# Patient Record
Sex: FEMALE | Race: WHITE | HISPANIC OR LATINO | Employment: FULL TIME | URBAN - METROPOLITAN AREA
[De-identification: names, ages, dates, MRNs, and addresses within clinical notes are randomized per-mention and may not be internally consistent; named-entity substitution may affect disease eponyms.]

---

## 2017-01-05 ENCOUNTER — APPOINTMENT (OUTPATIENT)
Dept: RADIOLOGY | Facility: IMAGING CENTER | Age: 32
End: 2017-01-05
Payer: COMMERCIAL

## 2017-01-05 ENCOUNTER — OFFICE VISIT (OUTPATIENT)
Dept: PULMONOLOGY | Facility: HOSPICE | Age: 32
End: 2017-01-05
Payer: COMMERCIAL

## 2017-01-05 VITALS
BODY MASS INDEX: 26.66 KG/M2 | SYSTOLIC BLOOD PRESSURE: 110 MMHG | HEIGHT: 65 IN | TEMPERATURE: 97.3 F | DIASTOLIC BLOOD PRESSURE: 80 MMHG | HEART RATE: 94 BPM | RESPIRATION RATE: 14 BRPM | WEIGHT: 160 LBS

## 2017-01-05 VITALS — BODY MASS INDEX: 26.66 KG/M2 | WEIGHT: 160 LBS | HEIGHT: 65 IN

## 2017-01-05 DIAGNOSIS — R06.00 DYSPNEA, UNSPECIFIED TYPE: ICD-10-CM

## 2017-01-05 DIAGNOSIS — R06.2 WHEEZING: ICD-10-CM

## 2017-01-05 DIAGNOSIS — R91.8 NONSPECIFIC ABNORMAL FINDING OF LUNG FIELD: ICD-10-CM

## 2017-01-05 DIAGNOSIS — R05.9 COUGH: ICD-10-CM

## 2017-01-05 PROCEDURE — 71020 DX-CHEST-2 VIEWS: CPT | Mod: TC | Performed by: INTERNAL MEDICINE

## 2017-01-05 PROCEDURE — 99204 OFFICE O/P NEW MOD 45 MIN: CPT | Mod: 25 | Performed by: INTERNAL MEDICINE

## 2017-01-05 RX ORDER — ALBUTEROL SULFATE 90 UG/1
2 AEROSOL, METERED RESPIRATORY (INHALATION) EVERY 6 HOURS PRN
COMMUNITY
End: 2018-06-07

## 2017-01-05 ASSESSMENT — PULMONARY FUNCTION TESTS
FEV1/FVC: 90.76
FEV1: 2.62
FVC_PERCENT_PREDICTED: 88
FEV1: 3.24
FEV1/FVC_PERCENT_PREDICTED: 89
FEV1_PREDICTED: 3.33
FEV1_PERCENT_PREDICTED: 78
FVC: 3.45
FEV1/FVC_PERCENT_PREDICTED: 107
FEV1/FVC_PERCENT_PREDICTED: 86
FEV1_PERCENT_PREDICTED: 97
FVC_PERCENT_PREDICTED: 91
FEV1_PERCENT_CHANGE: 3
FVC: 3.57
FVC_PREDICTED: 3.89
FEV1_PERCENT_CHANGE: 23
FEV1/FVC: 76
FEV1/FVC_PERCENT_CHANGE: 767

## 2017-01-05 NOTE — PROCEDURES
Technician: CHITRA Frye    Technician Comment:  Good patient effort & cooperation.  The results of this test meet the ATS/ERS standards for acceptability & reproducibility.  Test was performed on the REVENTIVE Body Plethysmograph-Elite DX system.  Predicted values were [facility]-3 for spirometry, [facility] for DLCO, ITS for Lung Volumes.  The DLCO was uncorrected for Hgb.  A bronchodilator of Ventolin HFA -2puffs via spacer administered.    Interpretation:    Spirometry reveals a normal forced vital capacity at 88%, a slightly low FEV1 at 78%, and a reduced FEV1/FVC percent at 76%. The FEV1 improves 23% following inhaled bronchodilators and the FEF 25-75% improves 109% following inhaled Ventolin. Low volume loop confirms a dramatic improvement in flow rates following inhaled Ventolin. Lung volumes, gas transfer, and airways resistance are all normal.    Overall, this PFTs consistent with mild asthma with a large and significant bronchodilator response.

## 2017-01-05 NOTE — MR AVS SNAPSHOT
"        Mirlande Jack Dennis   2017 10:40 AM   Office Visit   MRN: [id number]    Department:  [facility]   Dept Phone:  [phone]    Description:  Female : 1985   Provider:  Lobo Murray M.D.           Reason for Visit     Establish Care SOB, Cough, Wheezing       Allergies as of 2017     No Known Allergies      You were diagnosed with     Cough   [786.2.ICD-9-CM]       Wheezing   [786.07.ICD-9-CM]       Dyspnea, unspecified type   [9956004]         Vital Signs     Blood Pressure Pulse Temperature Respirations Height Weight    110/80 mmHg 94 36.3 °C (97.3 °F) (Temporal) 14 1.651 m (5' 5\") 72.576 kg (160 lb)    Body Mass Index Last Menstrual Period Smoking Status             26.63 kg/m2 2016 Former Smoker         Basic Information     Date Of Birth Sex Race Ethnicity Preferred Language    1985 Female  or   Origin (Estonian,Papua New Guinean,Scottish,Hieu, etc) English      Problem List              ICD-10-CM Priority Class Noted - Resolved    Cough R05   2017 - Present    Wheezing R06.2   2017 - Present    Dyspnea R06.00   2017 - Present      Health Maintenance        Date Due Completion Dates    IMM DTaP/Tdap/Td Vaccine (1 - Tdap) 2006 ---    PAP SMEAR 2008 ---    IMM INFLUENZA (1) 2016 ---            Current Immunizations     No immunizations on file.      Below and/or attached are the medications your provider expects you to take. Review all of your home medications and newly ordered medications with your provider and/or pharmacist. Follow medication instructions as directed by your provider and/or pharmacist. Please keep your medication list with you and share with your provider. Update the information when medications are discontinued, doses are changed, or new medications (including over-the-counter products) are added; and carry medication information at all times in the event of emergency situations     Allergies:  No " Known Allergies          Medications  Valid as of: January 05, 2017 - 11:24 AM    Generic Name Brand Name Tablet Size Instructions for use    Albuterol Sulfate (Aero Soln) albuterol 108 (90 BASE) MCG/ACT Inhale 2 Puffs by mouth every 6 hours as needed for Shortness of Breath.        Amoxicillin (Cap) AMOXIL 500 MG Take 1 Cap by mouth 3 times a day.        Azithromycin (Tab) ZITHROMAX 250 MG Use as directed on ASHLY        Azithromycin (Tab) ZITHROMAX 250 MG 2 tabs day number one then one tab daily for remaining 4 days        Benzonatate (Cap) TESSALON 100 MG Take 1 Cap by mouth 3 times a day as needed for Cough.        Fluticasone Furoate-Vilanterol (AEROSOL POWDER, BREATH ACTIVATED) Fluticasone Furoate-Vilanterol 200-25 MCG/INH Take 1 Inhalation by mouth every day. Rinse mouth after use.        Hydrocod Polst-Chlorphen Polst (Liquid CR) TUSSIONEX 10-8 MG/5ML Take 5 mL by mouth every 12 hours.        HydrOXYzine HCl (Tab) ATARAX 25 MG Take 1 Tab by mouth 3 times a day as needed for Itching.        HydrOXYzine HCl (Tab) ATARAX 25 MG Take 1 Tab by mouth 3 times a day as needed for Itching.        MethylPREDNISolone (Tab) MEDROL DOSPACK 4 MG Take as directed.        MethylPREDNISolone (Tablet Therapy Pack) MEDROL DOSEPAK 4 MG Take as directed with food        .                 Medicines prescribed today were sent to:     [id number] - [place], [place] - [address]    [address] [place] [address]    Phone: [phone] Fax: [phone]    Open 24 Hours?: No      Medication refill instructions:       If your prescription bottle indicates you have medication refills left, it is not necessary to call your provider’s office. Please contact your pharmacy and they will refill your medication.    If your prescription bottle indicates you do not have any refills left, you may request refills at any time through one of the following ways: The online GoPlanit system (except Urgent Care), by calling your provider’s  office, or by asking your pharmacy to contact your provider’s office with a refill request. Medication refills are processed only during regular business hours and may not be available until the next business day. Your provider may request additional information or to have a follow-up visit with you prior to refilling your medication.   *Please Note: Medication refills are assigned a new Rx number when refilled electronically. Your pharmacy may indicate that no refills were authorized even though a new prescription for the same medication is available at the pharmacy. Please request the medicine by name with the pharmacy before contacting your provider for a refill.           [employer] Access Code: [email or web address]  Expires: 1/17/2017  8:16 AM    [employer]  A secure, online tool to manage your health information     [employer]’s [employer]® is a secure, online tool that connects you to your personalized health information from the privacy of your home -- day or night - making it very easy for you to manage your healthcare. Once the activation process is completed, you can even access your medical information using the [employer] javier, which is available for free in the Apple Javier store or Google Play store.     [employer] provides the following levels of access (as shown below):   My Chart Features   [facility]  Urgent  Care [facility]  Primary Care  Doctor   Email your healthcare team securely and privately 24/7 X X X    Manage appointments: schedule your next appointment; view details of past/upcoming appointments X      Request prescription refills. X      View recent personal medical records, including lab and immunizations X X X X   View health record, including health history, allergies, medications X X X X   Read reports about your outpatient visits, procedures, consult and ER notes X X X X   See your discharge summary, which is a recap of your hospital and/or ER visit that  includes your diagnosis, lab results, and care plan. X X       How to register for [employer]Friend[employer]:  1. Go to  [email or web address].  2. Click on the Sign Up Now box, which takes you to the New Member Sign Up page. You will need to provide the following information:  a. Enter your TheFriend[employer] Access Code exactly as it appears at the top of this page. (You will not need to use this code after you’ve completed the sign-up process. If you do not sign up before the expiration date, you must request a new code.)   b. Enter your date of birth.   c. Enter your home email address.   d. Click Submit, and follow the next screen’s instructions.  3. Create a Zoceret ID. This will be your Zoceret login ID and cannot be changed, so think of one that is secure and easy to remember.  4. Create a Zoceret password. You can change your password at any time.  5. Enter your Password Reset Question and Answer. This can be used at a later time if you forget your password.   6. Enter your e-mail address. This allows you to receive e-mail notifications when new information is available in TheFriend[employer].  7. Click Sign Up. You can now view your health information.    For assistance activating your [employer]Friend[employer] account, call [phone]

## 2017-01-05 NOTE — PROGRESS NOTES
CC: cough and sob    HPI:  31-year-old woman self referred for evaluation of pulmonary complaints. The patient first developed problems with cough and wheezing chest tightness and shortness of breath about 3 years ago. She has  allergies to dogs and grasses she has taken Zyrtec. Has been worked up at [facility]. 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    Her symptoms are aggravated when it is cold or smoky. She has not required hospitalization for these symptoms. She has been treated with a rescue inhaler, antibodies, and a Medrol dosepak. 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 Had exposure to lead for 4-6 years. She hails from [facility] and moved to [place] in 2006. She has exposure to cats and dogs but not to birds, mice, rodents, or reptiles. She quit smoking in 2012 after smoking one pack a week on and off for about 6 years.    Her pulmonary function studies show an FVC of 88% predicted, an FEV1 of 78% of predicted, and a reduced FEV1/FVC percent. As a 23% improvement in the FEV1 following inhaled bronchodilators and a 109% improvement in the FEF 25-75% following inhaled Ventolin. Her lung volumes are normal as is diffusion and airways resistance. The flow volume loop shows a dramatic improvement in flow rates following inhaled bronchodilators.    Her chest x-ray today was clear.            Patient Active Problem List    Diagnosis Date Noted   • Cough 01/05/2017   • Wheezing 01/05/2017   • Dyspnea 01/05/2017       Past Medical History   Diagnosis Date   • Migraine 2011        No past surgical history on file.    Family History   Problem Relation Age of Onset   • Heart Disease Father    • Hypertension Mother        Social History     Social History   • Marital Status:  "     Spouse Name: N/A   • Number of Children: N/A   • Years of Education: N/A     Occupational History   • Not on file.     Social History Main Topics   • Smoking status: Former Smoker -- 0.30 packs/day for 4 years     Quit date: 01/29/2010   • Smokeless tobacco: Never Used   • Alcohol Use: No   • Drug Use: No   • Sexual Activity:     Partners: Male      Comment: nothing     Other Topics Concern   • Not on file     Social History Narrative       Current Outpatient Prescriptions   Medication Sig Dispense Refill   • albuterol (VENTOLIN HFA) 108 (90 BASE) MCG/ACT Aero Soln inhalation aerosol Inhale 2 Puffs by mouth every 6 hours as needed for Shortness of Breath.     • amoxicillin (AMOXIL) 500 MG Cap Take 1 Cap by mouth 3 times a day. 30 Cap 0   • azithromycin (ZITHROMAX) 250 MG Tab 2 tabs day number one then one tab daily for remaining 4 days 6 Tab 0   • MethylPREDNISolone (MEDROL DOSEPAK) 4 MG Tablet Therapy Pack Take as directed with food 21 Tab 0   • hydrOXYzine (ATARAX) 25 MG Tab Take 1 Tab by mouth 3 times a day as needed for Itching. 30 Tab 0   • hydrOXYzine (ATARAX) 25 MG Tab Take 1 Tab by mouth 3 times a day as needed for Itching. 30 Tab 1   • methylPREDNISolone (MEDROL DOSPACK) 4 MG Tab Take as directed. 21 Tab 1   • benzonatate (TESSALON) 100 MG CAPS Take 1 Cap by mouth 3 times a day as needed for Cough. 30 Cap 0   • hydrocod polst-chlorphen polst (TUSSIONEX) 10-8 MG/5ML LQCR Take 5 mL by mouth every 12 hours. 100 mL 0   • azithromycin (ZITHROMAX Z-ASHLY) 250 MG TABS Use as directed on ASHLY 6 Tab 0     No current facility-administered medications for this visit.    \"CURRENT RX\"    ALLERGIES: Review of patient's allergies indicates no known allergies.    ROS  Constitutional: Denies fever, chills, sweats, weight loss. Has complaints of fatigue  Eyes: Denies glasses, vision loss, pain, drainage, double vision.   Ears/Nose/Mouth/Throat: Denies earache, difficulty hearing, rhinitis/nasal congestion, injury, " "persistent hoarseness, decayed teeth/toothaches.  Has ringing or buzzing in ears and recurrent sore throats  Cardiovascular: Denies palpitations, swelling in legs/feet, fainting, difficulty breathing when lying down but gets better when sitting up.   Respiratory: Per history of present illness.   Sleep: Has complaints of morning headaches and daytime sleepiness  Gastrointestinal: Denies difficulty swallowing, nausea, abdominal pain, diarrhea, constipation. Has complaints of heartburn Genitourinary: Denies frequent urination, painful urination, blood in urine, discharge.   Musculoskeletal: Denies painful joints, sore muscles. Has back pain Integumentary: Denies lumps, color changes. As rashes.  Neurological: Complains of frequent headaches, dizziness, weakness    PHYSICAL EXAM    /80 mmHg  Pulse 94  Temp(Src) 36.3 °C (97.3 °F) (Temporal)  Resp 14  Ht 1.651 m (5' 5\")  Wt 72.576 kg (160 lb)  BMI 26.63 kg/m2  LMP 11/25/2016  Appearance: Well-nourished, well-developed, no acute distress  Eyes:  PERRLA, EOMI  Hearing:  Grossly intact  Nose:  Normal, no lesions or deformities, turbinates moist  Oropharynx:  Tongue normal, posterior pharynx without erythema or exudate  Mallampati classification:    Neck: Supple, trachea midline, no masses  Respiratory effort:  No intercostal retractions or use of accessory muscles  Lung auscultation:  No wheezes rhonchi rubs or rales  Cardiac auscultation:  No murmurs, rubs, or gallops, no regular rhythm, normal rate  Abdomen:  No tenderness, no organomegaly  Extremities:  No cyanosis, clubbing, edema  Gait and Station:  Normal  Digits and nails: No clubbing, cyanosis, petechiae, or nodes  Musculoskeletal:  Grossly normal  Skin:  No rashes  Orientation:  Oriented time, place, and person  Mood and affect:  No depression, anxiety, agitation  Judgment:  Intact    PROBLEMS:  1. Cough  - ALLERGEN PANEL, IGE BY IMMUNOCAP CAMILA; Future  2. Wheezing  3. Dyspnea, unspecified " type          PLAN:   She will follow up with the allergists. 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    She will be challenged with Breo 200/25 µg one inhalation daily. She will return in a few months to see how adding a controller medication has worked.

## 2017-01-05 NOTE — MR AVS SNAPSHOT
"Mirlande Collins   2017 9:30 AM   Office Visit   MRN: [id number]    Department:  [facility]   Dept Phone:  [phone]    Description:  Female : 1985   Provider:  Lobo Murray M.D.           Allergies as of 2017     No Known Allergies      You were diagnosed with     Dyspnea, unspecified type   [6017093]         Vital Signs     Height Weight Body Mass Index Last Menstrual Period Smoking Status       1.651 m (5' 5\") 72.576 kg (160 lb) 26.63 kg/m2 2016 Former Smoker       Basic Information     Date Of Birth Sex Race Ethnicity Preferred Language    1985 Female  or   Origin (Kiswahili,East Timorese,Libyan,Hieu, etc) English      Problem List              ICD-10-CM Priority Class Noted - Resolved    Cough R05   2017 - Present    Wheezing R06.2   2017 - Present    Dyspnea R06.00   2017 - Present      Health Maintenance        Date Due Completion Dates    IMM DTaP/Tdap/Td Vaccine (1 - Tdap) 2006 ---    PAP SMEAR 2008 ---    IMM INFLUENZA (1) 2016 ---            Current Immunizations     No immunizations on file.      Below and/or attached are the medications your provider expects you to take. Review all of your home medications and newly ordered medications with your provider and/or pharmacist. Follow medication instructions as directed by your provider and/or pharmacist. Please keep your medication list with you and share with your provider. Update the information when medications are discontinued, doses are changed, or new medications (including over-the-counter products) are added; and carry medication information at all times in the event of emergency situations     Allergies:  No Known Allergies          Medications  Valid as of: 2017 - 10:55 AM    Generic Name Brand Name Tablet Size Instructions for use    Albuterol Sulfate (Aero Soln) albuterol 108 (90 BASE) MCG/ACT Inhale 2 Puffs by mouth every 6 " hours as needed for Shortness of Breath.        Amoxicillin (Cap) AMOXIL 500 MG Take 1 Cap by mouth 3 times a day.        Azithromycin (Tab) ZITHROMAX 250 MG Use as directed on ASHLY        Azithromycin (Tab) ZITHROMAX 250 MG 2 tabs day number one then one tab daily for remaining 4 days        Benzonatate (Cap) TESSALON 100 MG Take 1 Cap by mouth 3 times a day as needed for Cough.        Hydrocod Polst-Chlorphen Polst (Liquid CR) TUSSIONEX 10-8 MG/5ML Take 5 mL by mouth every 12 hours.        HydrOXYzine HCl (Tab) ATARAX 25 MG Take 1 Tab by mouth 3 times a day as needed for Itching.        HydrOXYzine HCl (Tab) ATARAX 25 MG Take 1 Tab by mouth 3 times a day as needed for Itching.        MethylPREDNISolone (Tab) MEDROL DOSPACK 4 MG Take as directed.        MethylPREDNISolone (Tablet Therapy Pack) MEDROL DOSEPAK 4 MG Take as directed with food        .                 Medicines prescribed today were sent to:     [facility]ACY #[id number] - [place] - [address]    [address] [place] [address]    Phone: [phone] Fax: [phone]    Open 24 Hours?: No      Medication refill instructions:       If your prescription bottle indicates you have medication refills left, it is not necessary to call your provider’s office. Please contact your pharmacy and they will refill your medication.    If your prescription bottle indicates you do not have any refills left, you may request refills at any time through one of the following ways: The online [employer] system (except Urgent Care), by calling your provider’s office, or by asking your pharmacy to contact your provider’s office with a refill request. Medication refills are processed only during regular business hours and may not be available until the next business day. Your provider may request additional information or to have a follow-up visit with you prior to refilling your medication.   *Please Note: Medication refills are assigned a new Rx number when refilled  electronically. Your pharmacy may indicate that no refills were authorized even though a new prescription for the same medication is available at the pharmacy. Please request the medicine by name with the pharmacy before contacting your provider for a refill.           [employer] Access Code: [email or web address]  Expires: 1/17/2017  8:16 AM    [employer]  A secure, online tool to manage your health information     [employer]’s [employer]ley [employer]® is a secure, online tool that connects you to your personalized health information from the privacy of your home -- day or night - making it very easy for you to manage your healthcare. Once the activation process is completed, you can even access your medical information using the Aud[employer] javier, which is available for free in the Apple Javier store or Google Play store.     [employer] provides the following levels of access (as shown below):   My Chart Features   [facility] Doctor [facility]  Specialists [facility]  Urgent  Care [facility]  Primary Care  Doctor   Email your healthcare team securely and privately 24/7 X X X    Manage appointments: schedule your next appointment; view details of past/upcoming appointments X      Request prescription refills. X      View recent personal medical records, including lab and immunizations X X X X   View health record, including health history, allergies, medications X X X X   Read reports about your outpatient visits, procedures, consult and ER notes X X X X   See your discharge summary, which is a recap of your hospital and/or ER visit that includes your diagnosis, lab results, and care plan. X X       How to register for Audley [employer]:  1. Go to  [email or web address].  2. Click on the Sign Up Now box, which takes you to the New Member Sign Up page. You will need to provide the following information:  a. Enter your Aud[employer] Access Code exactly as it appears at the top of this page. (You will not need to use this code after you’ve completed the sign-up  process. If you do not sign up before the expiration date, you must request a new code.)   b. Enter your date of birth.   c. Enter your home email address.   d. Click Submit, and follow the next screen’s instructions.  3. Create a ShopEatt ID. This will be your ShopEatt login ID and cannot be changed, so think of one that is secure and easy to remember.  4. Create a ShopEatt password. You can change your password at any time.  5. Enter your Password Reset Question and Answer. This can be used at a later time if you forget your password.   6. Enter your e-mail address. This allows you to receive e-mail notifications when new information is available in [employer].  7. Click Sign Up. You can now view your health information.    For assistance activating your [employer] account, call [phone]

## 2017-02-26 ENCOUNTER — OCCUPATIONAL MEDICINE (OUTPATIENT)
Dept: URGENT CARE | Facility: CLINIC | Age: 32
End: 2017-02-26
Payer: COMMERCIAL

## 2017-02-26 VITALS
HEART RATE: 97 BPM | BODY MASS INDEX: 27.16 KG/M2 | SYSTOLIC BLOOD PRESSURE: 114 MMHG | DIASTOLIC BLOOD PRESSURE: 66 MMHG | WEIGHT: 163 LBS | HEIGHT: 65 IN | TEMPERATURE: 98.1 F | RESPIRATION RATE: 16 BRPM | OXYGEN SATURATION: 92 %

## 2017-02-26 DIAGNOSIS — M54.50 ACUTE LEFT-SIDED LOW BACK PAIN WITHOUT SCIATICA: ICD-10-CM

## 2017-02-26 DIAGNOSIS — M62.830 MUSCLE SPASM OF BACK: ICD-10-CM

## 2017-02-26 PROCEDURE — 99214 OFFICE O/P EST MOD 30 MIN: CPT | Mod: 29 | Performed by: NURSE PRACTITIONER

## 2017-02-26 RX ORDER — DIPHENHYDRAMINE HCL 25 MG
25 TABLET ORAL EVERY 6 HOURS PRN
COMMUNITY
End: 2018-06-07

## 2017-02-26 RX ORDER — METHYLPREDNISOLONE SODIUM SUCCINATE 125 MG/2ML
125 INJECTION, POWDER, LYOPHILIZED, FOR SOLUTION INTRAMUSCULAR; INTRAVENOUS ONCE
Status: COMPLETED | OUTPATIENT
Start: 2017-02-26 | End: 2017-02-26

## 2017-02-26 RX ORDER — TIZANIDINE 4 MG/1
4 TABLET ORAL 2 TIMES DAILY
Qty: 10 TAB | Refills: 0 | Status: SHIPPED | OUTPATIENT
Start: 2017-02-26 | End: 2018-06-07

## 2017-02-26 RX ORDER — KETOROLAC TROMETHAMINE 30 MG/ML
30 INJECTION, SOLUTION INTRAMUSCULAR; INTRAVENOUS ONCE
Status: COMPLETED | OUTPATIENT
Start: 2017-02-26 | End: 2017-02-26

## 2017-02-26 RX ADMIN — METHYLPREDNISOLONE SODIUM SUCCINATE 125 MG: 125 INJECTION, POWDER, LYOPHILIZED, FOR SOLUTION INTRAMUSCULAR; INTRAVENOUS at 11:02

## 2017-02-26 RX ADMIN — KETOROLAC TROMETHAMINE 30 MG: 30 INJECTION, SOLUTION INTRAMUSCULAR; INTRAVENOUS at 11:01

## 2017-02-26 ASSESSMENT — ENCOUNTER SYMPTOMS
FEVER: 0
WEAKNESS: 0
MYALGIAS: 1
BACK PAIN: 1
TINGLING: 0
SENSORY CHANGE: 0
CHILLS: 0

## 2017-02-26 ASSESSMENT — PAIN SCALES - GENERAL: PAINLEVEL: 7=MODERATE-SEVERE PAIN

## 2017-02-26 NOTE — MR AVS SNAPSHOT
"        Mirlande MCDERMOTT Guero Collins   2017 10:15 AM   Occupational Medicine   MRN: [id number]    Department:  [facility]   Dept Phone:  [phone]    Description:  Female : 1985   Provider:  PERLA Quinonez           Reason for Visit     Work-Related Injury Last night; Back strain, pulling dog kennel at work, strained back and is experiencing instense back spasm       Allergies as of 2017     No Known Allergies      You were diagnosed with     Muscle spasm of back   [071769]       Acute left-sided low back pain without sciatica   [4158753]         Vital Signs     Blood Pressure Pulse Temperature Respirations Height Weight    114/66 mmHg 97 36.7 °C (98.1 °F) 16 1.651 m (5' 5\") 73.936 kg (163 lb)    Body Mass Index Oxygen Saturation Smoking Status             27.12 kg/m2 92% Former Smoker         Basic Information     Date Of Birth Sex Race Ethnicity Preferred Language    1985 Female  or   Origin (Palauan,Nicaraguan,Mozambican,Hieu, etc) English      Your appointments     May 15, 2017  2:20 PM   Established Patient Pul with A Rotation   [facility] (--)    [address]  [address]  [facility] [id number] [phone]              Problem List              ICD-10-CM Priority Class Noted - Resolved    Cough R05   2017 - Present    Wheezing R06.2   2017 - Present    Dyspnea R06.00   2017 - Present      Health Maintenance        Date Due Completion Dates    IMM DTaP/Tdap/Td Vaccine (1 - Tdap) 2006 ---    PAP SMEAR 2008 ---    IMM INFLUENZA (1) 2016 ---            Current Immunizations     No immunizations on file.      Below and/or attached are the medications your provider expects you to take. Review all of your home medications and newly ordered medications with your provider and/or pharmacist. Follow medication instructions as directed by your provider and/or pharmacist. Please keep your medication list with " you and share with your provider. Update the information when medications are discontinued, doses are changed, or new medications (including over-the-counter products) are added; and carry medication information at all times in the event of emergency situations     Allergies:  No Known Allergies          Medications  Valid as of: February 26, 2017 - 11:10 AM    Generic Name Brand Name Tablet Size Instructions for use    Albuterol Sulfate (Aero Soln) albuterol 108 (90 BASE) MCG/ACT Inhale 2 Puffs by mouth every 6 hours as needed for Shortness of Breath.        Amoxicillin (Cap) AMOXIL 500 MG Take 1 Cap by mouth 3 times a day.        Azithromycin (Tab) ZITHROMAX 250 MG Use as directed on ASHLY        Azithromycin (Tab) ZITHROMAX 250 MG 2 tabs day number one then one tab daily for remaining 4 days        Benzonatate (Cap) TESSALON 100 MG Take 1 Cap by mouth 3 times a day as needed for Cough.        DiphenhydrAMINE HCl (Tab) BENADRYL 25 MG Take 25 mg by mouth every 6 hours as needed for Sleep.        Fluticasone Furoate-Vilanterol (AEROSOL POWDER, BREATH ACTIVATED) Fluticasone Furoate-Vilanterol 200-25 MCG/INH Take 1 Inhalation by mouth every day. Rinse mouth after use.        Hydrocod Polst-Chlorphen Polst (Liquid CR) TUSSIONEX 10-8 MG/5ML Take 5 mL by mouth every 12 hours.        HydrOXYzine HCl (Tab) ATARAX 25 MG Take 1 Tab by mouth 3 times a day as needed for Itching.        HydrOXYzine HCl (Tab) ATARAX 25 MG Take 1 Tab by mouth 3 times a day as needed for Itching.        MethylPREDNISolone (Tab) MEDROL DOSPACK 4 MG Take as directed.        MethylPREDNISolone (Tablet Therapy Pack) MEDROL DOSEPAK 4 MG Take as directed with food        TiZANidine HCl (Tab) ZANAFLEX 4 MG Take 1 Tab by mouth 2 times a day. Causes drowsiness, do not drive or work while using        .                 Medicines prescribed today were sent to:     [id number] - [place], [place] - [address]    [address] [place] [address]     Phone: [phone] Fax: [phone]    Open 24 Hours?: No      Medication refill instructions:       If your prescription bottle indicates you have medication refills left, it is not necessary to call your provider’s office. Please contact your pharmacy and they will refill your medication.    If your prescription bottle indicates you do not have any refills left, you may request refills at any time through one of the following ways: The online [employer] system (except Urgent Care), by calling your provider’s office, or by asking your pharmacy to contact your provider’s office with a refill request. Medication refills are processed only during regular business hours and may not be available until the next business day. Your provider may request additional information or to have a follow-up visit with you prior to refilling your medication.   *Please Note: Medication refills are assigned a new Rx number when refilled electronically. Your pharmacy may indicate that no refills were authorized even though a new prescription for the same medication is available at the pharmacy. Please request the medicine by name with the pharmacy before contacting your provider for a refill.           [employer] Access Code: [email or web address]  Expires: 3/16/2017 12:12 PM    [employer]  A secure, online tool to manage your health information     [employer]’s [employer]® is a secure, online tool that connects you to your personalized health information from the privacy of your home -- day or night - making it very easy for you to manage your healthcare. Once the activation process is completed, you can even access your medical information using the [employer] javier, which is available for free in the Apple Javier store or Google Play store.     [employer] provides the following levels of access (as shown below):   My Chart Features   [facility] Doctor [facility]  Specialists [facility]  Urgent  Care [facility]  Primary Care  Doctor   Email your  healthcare team securely and privately 24/7 X X X    Manage appointments: schedule your next appointment; view details of past/upcoming appointments X      Request prescription refills. X      View recent personal medical records, including lab and immunizations X X X X   View health record, including health history, allergies, medications X X X X   Read reports about your outpatient visits, procedures, consult and ER notes X X X X   See your discharge summary, which is a recap of your hospital and/or ER visit that includes your diagnosis, lab results, and care plan. X X       How to register for [employer]:  1. Go to  [email or web address].  2. Click on the Sign Up Now box, which takes you to the New Member Sign Up page. You will need to provide the following information:  a. Enter your [employer] Access Code exactly as it appears at the top of this page. (You will not need to use this code after you’ve completed the sign-up process. If you do not sign up before the expiration date, you must request a new code.)   b. Enter your date of birth.   c. Enter your home email address.   d. Click Submit, and follow the next screen’s instructions.  3. Create a [employer]Builder ID. This will be your [employer]Builder login ID and cannot be changed, so think of one that is secure and easy to remember.  4. Create a [employer] password. You can change your password at any time.  5. Enter your Password Reset Question and Answer. This can be used at a later time if you forget your password.   6. Enter your e-mail address. This allows you to receive e-mail notifications when new information is available in [employer]Builder.  7. Click Sign Up. You can now view your health information.    For assistance activating your [employer] account, call [phone]

## 2017-02-26 NOTE — PROGRESS NOTES
"Subjective:      Mirlande Collins is a 31 y.o. female who presents with Work-Related Injury      DOI 2/25/17. Pulled out a dog crate while bending over and felt pain on left side of back. States crate was about \"40#\" and dog inside was about \"15#\". States had taken Ibuprofen 400 mg last night which helped. States had \"iced and warmed\" her back x 2 hrs last night which helped. Has another job that requires sitting for long periods at a desk. Denies heavy lifting. Denies numbness/tingling. No previous back injury. Denies trauma or fall.     HPI  See above    PMH:  has a past medical history of Migraine (2011).  MEDS:   Current outpatient prescriptions:   •  diphenhydrAMINE (BENADRYL) 25 MG Tab, Take 25 mg by mouth every 6 hours as needed for Sleep., Disp: , Rfl:   •  tizanidine (ZANAFLEX) 4 MG Tab, Take 1 Tab by mouth 2 times a day. Causes drowsiness, do not drive or work while using, Disp: 10 Tab, Rfl: 0  •  Fluticasone Furoate-Vilanterol (BREO ELLIPTA) 200-25 MCG/INH AEROSOL POWDER, BREATH ACTIVATED, Take 1 Inhalation by mouth every day. Rinse mouth after use., Disp: 1 Each, Rfl: 3  •  albuterol (VENTOLIN HFA) 108 (90 BASE) MCG/ACT Aero Soln inhalation aerosol, Inhale 2 Puffs by mouth every 6 hours as needed for Shortness of Breath., Disp: , Rfl:   •  amoxicillin (AMOXIL) 500 MG Cap, Take 1 Cap by mouth 3 times a day., Disp: 30 Cap, Rfl: 0  •  azithromycin (ZITHROMAX) 250 MG Tab, 2 tabs day number one then one tab daily for remaining 4 days, Disp: 6 Tab, Rfl: 0  •  MethylPREDNISolone (MEDROL DOSEPAK) 4 MG Tablet Therapy Pack, Take as directed with food, Disp: 21 Tab, Rfl: 0  •  hydrOXYzine (ATARAX) 25 MG Tab, Take 1 Tab by mouth 3 times a day as needed for Itching., Disp: 30 Tab, Rfl: 0  •  hydrOXYzine (ATARAX) 25 MG Tab, Take 1 Tab by mouth 3 times a day as needed for Itching., Disp: 30 Tab, Rfl: 1  •  methylPREDNISolone (MEDROL DOSPACK) 4 MG Tab, Take as directed., Disp: 21 Tab, Rfl: 1  •  benzonatate " "(TESSALON) 100 MG CAPS, Take 1 Cap by mouth 3 times a day as needed for Cough., Disp: 30 Cap, Rfl: 0  •  hydrocod polst-chlorphen polst (TUSSIONEX) 10-8 MG/5ML LQCR, Take 5 mL by mouth every 12 hours., Disp: 100 mL, Rfl: 0  •  azithromycin (ZITHROMAX Z-ASHLY) 250 MG TABS, Use as directed on ASHLY, Disp: 6 Tab, Rfl: 0  ALLERGIES: No Known Allergies  SURGHX: History reviewed. No pertinent past surgical history.  SOCHX:  reports that she quit smoking about 7 years ago. She has never used smokeless tobacco. She reports that she does not drink alcohol or use illicit drugs.  FH: Family history was reviewed, no pertinent findings to report    Review of Systems   Constitutional: Negative for fever, chills and malaise/fatigue.   Musculoskeletal: Positive for myalgias and back pain.   Neurological: Negative for tingling, sensory change and weakness.          Objective:     /66 mmHg  Pulse 97  Temp(Src) 36.7 °C (98.1 °F)  Resp 16  Ht 1.651 m (5' 5\")  Wt 73.936 kg (163 lb)  BMI 27.12 kg/m2  SpO2 92%     Physical Exam   Constitutional: She is oriented to person, place, and time. She appears well-developed and well-nourished. No distress.   HENT:   Head: Normocephalic.   Eyes: Conjunctivae and EOM are normal. Pupils are equal, round, and reactive to light.   Neck: Normal range of motion. Neck supple.   Cardiovascular: Normal rate.    Pulmonary/Chest: Effort normal.   Musculoskeletal:        Lumbar back: She exhibits decreased range of motion, tenderness, pain and spasm. She exhibits no bony tenderness, no swelling, no edema, no deformity and normal pulse.        Back:    Neurological: She is alert and oriented to person, place, and time.   Skin: Skin is warm and dry. She is not diaphoretic.   Vitals reviewed.      A/O x 4, favors left side when walking due to discomfort. Decreased ROM with flexion/extension due to discomfort. Skin sensation intact, p/w/d. No back deformity, swelling. TTP at left lower back paraspinous " muscle with muscle spasm felt. Equal leg strength, no difficulty with ambulation. Patient given steroidal and non-narcotic pain IM injections today for pain.       Assessment/Plan:     1. Muscle spasm of back    - tizanidine (ZANAFLEX) 4 MG Tab; Take 1 Tab by mouth 2 times a day. Causes drowsiness, do not drive or work while using  Dispense: 10 Tab; Refill: 0  - methylPREDNISolone sod succ (SOLU-MEDROL) 125 MG injection 125 mg; 2 mL by Intramuscular route Once.  - ketorolac (TORADOL) injection 30 mg; 1 mL by Intramuscular route Once.    2. Acute left-sided low back pain without sciatica    - methylPREDNISolone sod succ (SOLU-MEDROL) 125 MG injection 125 mg; 2 mL by Intramuscular route Once.  - ketorolac (TORADOL) injection 30 mg; 1 mL by Intramuscular route Once.    Take Ibuprofen prn for discomfort  May use cool compresses for swelling and warm compresses for muscle stiffness   May perform muscle stretches as tolerated after warm compresses to maintain mobility, avoid abdominal crunches  May use muscle relaxant prn when at home only   May apply topical analgesics prn  Perform proper body mechanics with lifting, twisting, bending and reaching. Ask for assistance with heay objects  Monitor for bowel/urination problems, numbness/tingling in lower extremities, decreased ROM with ambulation difficulty- need re-evaluation  Recheck on 3/1/17

## 2017-02-26 NOTE — Clinical Note
"EMPLOYEE’S CLAIM FOR COMPENSATION/ REPORT OF INITIAL TREATMENT  FORM C-4    EMPLOYEE’S CLAIM - PROVIDE ALL INFORMATION REQUESTED   First Name  Mirlande Last Name  Guero Collins Birthdate                    1985                Sex  female Claim Number  NA   Home Address  [address]  Age  31 y.o. Height  1.651 m (5' 5\") Weight  73.936 kg (163 lb) [place]     [facility] Zip  [address] Telephone  [phone] (home)    Mailing Address  [address]  [facility] Zip  [address] Primary Language Spoken  English    Insurer   Third Party   Employers Insurance   Employee's Occupation (Job Title) When Injury or Occupational Disease Occurred     Employer's Name     Telephone      Employer Address    City    State    Zip      Date of Injury  2/25/2017               Hour of Injury  6:00 PM Date Employer Notified  2/25/2017 Last Day of Work after Injury or Occupational Disease  2/25/2017 Supervisor to Whom Injury Reported     Address or Location of Accident (if applicable)  [[address]]   What were you doing at the time of accident? (if applicable)  Bending down    How did this injury or occupational disease occur? (Be specific an answer in detail. Use additional sheet if necessary)  I bent down to pull a dog's crate and while pulling it out I pulled one of my back muscles   If you believe that you have an occupational disease, when did you first have knowledge of the disability and it relationship to your employment?  NA Witnesses to the Accident  None  Cameras      Nature of Injury or Occupational Disease  Strain  Part(s) of Body Injured or Affected  Lumbar and/or Sacral Vertebrae (Vertebrae NOC Trunk), ,     I certify that the above is true and correct to the best of my knowledge and that I have provided this information in order to obtain the benefits of [place] and " [place]  I hereby authorize any physician, chiropractor, surgeon, practitioner, or other person, any hospital, including [facility] or [facility], any medical service organization, any insurance company, or other institution or organization to release to each other, any medical or other information, including benefits paid or payable, pertinent to this injury or disease, except information relative to diagnosis, treatment and/or counseling for AIDS, psychological conditions, alcohol or controlled substances, for which I must give specific authorization.  A Photostat of this authorization shall be as valid as the original.     Date   Place   Employee’s Signature   THIS REPORT MUST BE COMPLETED AND MAILED WITHIN 3 WORKING DAYS OF TREATMENT   Place  [facility]  Name of Facility  [facility]   Date  2/26/2017 Diagnosis  (M62.830) Muscle spasm of back  (M54.5) Acute left-sided low back pain without sciatica Is there evidence the injured employee was under the influence of alcohol and/or another controlled substance at the time of accident?   Hour  10:27 AM Description of Injury or Disease  Diagnoses of Muscle spasm of back and Acute left-sided low back pain without sciatica were pertinent to this visit. No   Treatment  May use Ibuprofen as needed for pain, up to 600 mg every 4-6 hrs, may use ice/warm applications as needed for pain, may use topical analgesic as needed for pain, recommend small range of motion stretching as tolerated up to 4x/day  Have you advised the patient to remain off work five days or more? No   X-Ray Findings      If Yes   From Date  To Date      From information given by the employee, together with medical evidence, can you directly connect this injury or occupational disease as job incurred?  Yes If No Full Duty  No Modified Duty  Yes   Is additional medical care by a physician  "indicated?  Yes If Modified Duty, Specify any Limitations / Restrictions  No push/pull, bend, stoop, climb, squat, no lifting >10#   Do you know of any previous injury or disease contributing to this condition or occupational disease?                            No   Date  2/26/2017 Print Doctor’s Name PERLA Quinonez certify the employer’s copy of  this form was mailed on:   Address  [address] Insurer’s Use Only     [facility] Zip  [address]    Provider’s Tax ID Number  [id number]  Telephone  Dept: [phone]        e-MAUREEN Bowling   e-Signature: Dr. Tello Conway, Medical Director Degree  APRN        ORIGINAL-TREATING PHYSICIAN OR CHIROPRACTOR    PAGE 2-INSURER/TPA    PAGE 3-EMPLOYER    PAGE 4-EMPLOYEE             Form C-4 (rev10/07)              BRIEF DESCRIPTION OF RIGHTS AND BENEFITS  (Pursuant to [place] 616C.050)    Notice of Injury or Occupational Disease (Incident Report Form C-1): If an injury or occupational disease (OD) arises out of and in the  course of employment, you must provide written notice to your employer as soon as practicable, but no later than 7 days after the accident or  OD. Your employer shall maintain a sufficient supply of the required forms.    Claim for Compensation (Form C-4): If medical treatment is sought, the form C-4 is available at the place of initial treatment. A completed  \"Claim for Compensation\" (Form C-4) must be filed within 90 days after an accident or OD. The treating physician or chiropractor must,  within 3 working days after treatment, complete and mail to the employer, the employer's insurer and third-party , the Claim for  Compensation.    Medical Treatment: If you require medical treatment for your on-the-job injury or OD, you may be required to select a physician or  chiropractor from a list provided by your workers’ compensation insurer, if it has contracted with an Organization for " Managed Care (MCO) or  Preferred Provider Organization (PPO) or providers of health care. If your employer has not entered into a contract with an MCO or PPO, you  may select a physician or chiropractor from the Panel of Physicians and Chiropractors. Any medical costs related to your industrial injury or  OD will be paid by your insurer.    Temporary Total Disability (TTD): If your doctor has certified that you are unable to work for a period of at least 5 consecutive days, or 5  cumulative days in a 20-day period, or places restrictions on you that your employer does not accommodate, you may be entitled to TTD  compensation.    Temporary Partial Disability (TPD): If the wage you receive upon reemployment is less than the compensation for TTD to which you are  entitled, the insurer may be required to pay you TPD compensation to make up the difference. TPD can only be paid for a maximum of 24  months.    Permanent Partial Disability (PPD): When your medical condition is stable and there is an indication of a PPD as a result of your injury or  OD, within 30 days, your insurer must arrange for an evaluation by a rating physician or chiropractor to determine the degree of your PPD. The  amount of your PPD award depends on the date of injury, the results of the PPD evaluation and your age and wage.    Permanent Total Disability (PTD): If you are medically certified by a treating physician or chiropractor as permanently and totally disabled  and have been granted a PTD status by your insurer, you are entitled to receive monthly benefits not to exceed 66 2/3% of your average  monthly wage. The amount of your PTD payments is subject to reduction if you previously received a PPD award.    Vocational Rehabilitation Services: You may be eligible for vocational rehabilitation services if you are unable to return to the job due to a  permanent physical impairment or permanent restrictions as a result of your injury or  occupational disease.    Transportation and Per Latoya Reimbursement: You may be eligible for travel expenses and per latoya associated with medical treatment.    Reopening: You may be able to reopen your claim if your condition worsens after claim closure.    Appeal Process: If you disagree with a written determination issued by the insurer or the insurer does not respond to your request, you may  appeal to the Department of Administration, , by following the instructions contained in your determination letter. You must  appeal the determination within 70 days from the date of the determination letter at [address], [address], [place], [place]  [address], or [address], [place], [place] [address]. If you disagree with the  decision, you may appeal to the  Department of Administration, . You must file your appeal within 30 days from the date of the  decision  letter at [address], [address], [place], [place] [address], or [address], [place], [place] [address]. If you  disagree with a decision of an , you may file a petition for judicial review with the District Court. You must do so within 30  days of the Appeal Officer’s decision. You may be represented by an  at your own expense or you may contact the [facility] for possible  representation.    [place]  for Injured Workers (NAIW): If you disagree with a  decision, you may request that NAIW represent you  without charge at an  Hearing. For information regarding denial of benefits, you may contact the [facility] at: [address], [address], [place] [address], [phone], or [address], [place] [address], [phone]    To File a Complaint with the Division: If you wish to file a complaint with the  of the Division of Industrial Relations  (DIR),  please contact the Workers’ Compensation Section, [address], [address], [place], [place] [address], telephone [phone], or  [address], [address], [place], [place] [address], telephone [phone].    For assistance with Workers’ Compensation Issues: you may contact the Office of the [facility] Consumer Health Assistance, [address] [place] [address], [address], [place], [place] [address], Toll Free 1-533.126.6063, Web site: http://govcha.[facility].nv., E-mail  [email or web address].                                                                                                                                                                                                                                   __________________________________________________________________                                                                   _________________                Employee Name / Signature                                                                                                                                                       Date                                                                                                                                                                                                     D-2 (rev. 10/07)

## 2017-02-26 NOTE — Clinical Note
"   [facility] [place]   [address] - [place] [address]  Phone: [phone] - Fax: [phone]        [facility] Progress Report and Disability Certification  Date of Service: 2/26/2017   No Show:  No  Date / Time of Next Visit: 3/1/2017   Claim Information   Patient Name: Mirlande Collins  Claim Number:    NA   Employer:    Date of Injury: 2/25/2017     Insurer / TPA: Employers Insurance  ID / SSN:     Occupation:   Diagnosis: Diagnoses of Muscle spasm of back and Acute left-sided low back pain without sciatica were pertinent to this visit.    Medical Information   Related to Industrial Injury? Yes    Subjective Complaints:  DOI 2/25/17. Pulled out a dog crate while bending over and felt pain on left side of back. States crate was about \"40#\" and dog inside was about \"15#\". States had taken Ibuprofen 400 mg last night which helped. States had \"iced and warmed\" her back x 2 hrs last night which helped. Has another job that requires sitting for long periods at a desk. Denies heavy lifting. Denies numbness/tingling. No previous back injury. Denies trauma or fall.   Objective Findings: A/O x 4, favors left side when walking due to discomfort. Decreased ROM with flexion/extension due to discomfort. Skin sensation intact, p/w/d. No back deformity, swelling. TTP at left lower back paraspinous muscle with muscle spasm felt. Equal leg strength, no difficulty with ambulation. Patient given steroidal and non-narcotic pain IM injections today for pain.   Pre-Existing Condition(s):     Assessment:   Initial Visit    Status: Additional Care Required  Permanent Disability:No    Plan:   Comments:Ibuprofen as needed of pain    Diagnostics:      Comments:       Disability Information   Status: Released to Restricted Duty    From:  2/26/2017  Through: 3/1/2017 Restrictions are: Temporary   Physical Restrictions   Sitting:    Standing:  " Stoopin hrs/day Bendin hrs/day   Squattin hrs/day Walking:    Climbin hrs/day Pushin hrs/day   Pullin hrs/day Other:    Reaching Above Shoulder (L):   Reaching Above Shoulder (R):       Reaching Below Shoulder (L):    Reaching Below Shoulder (R):      Not to exceed Weight Limits   Carrying(hrs):   Weight Limit(lb): < or = to 10 pounds Lifting(hrs):   Weight  Limit(lb): < or = to 10 pounds   Comments: May use Ibuprofen as needed for pain, up to 600 mg every 4-6 hrs, may use ice/warm applications as needed for pain, may use topical analgesic as needed for pain, recommend small range of motion stretching as tolerated up to 4x/day. Recheck on 3/1/17.    Repetitive Actions   Hands: i.e. Fine Manipulations from Grasping:     Feet: i.e. Operating Foot Controls:     Driving / Operate Machinery:     Physician Name: PERLA Quinonez Physician Signature: MAUREEN Clarke e-Signature: Dr. Tello Conway, Medical Director   Clinic Name / Location: [address]  [place] [address] Clinic Phone Number: Dept: [phone]   Appointment Time: 10:15 Am Visit Start Time: 10:27 AM   Check-In Time:  10:24 Am Visit Discharge Time:    Original-Treating Physician or Chiropractor    Page 2-Insurer/TPA    Page 3-Employer    Page 4-Employee

## 2017-02-26 NOTE — Clinical Note
February 26, 2017       Patient: Mirlande Collins   YOB: 1985   Date of Visit: 2/26/2017         To Whom It May Concern:    It is my medical opinion that Mirlande Collins be excused from morning job tomorrow due to illness.    If you have any questions or concerns, please don't hesitate to call [phone]          Sincerely,          BOSSMAN Quinonez.  Electronically Signed

## 2017-05-15 ENCOUNTER — APPOINTMENT (OUTPATIENT)
Dept: PULMONOLOGY | Facility: HOSPICE | Age: 32
End: 2017-05-15
Payer: COMMERCIAL

## 2017-06-21 ENCOUNTER — OCCUPATIONAL MEDICINE (OUTPATIENT)
Dept: URGENT CARE | Facility: CLINIC | Age: 32
End: 2017-06-21
Payer: COMMERCIAL

## 2017-06-21 VITALS
HEIGHT: 66 IN | WEIGHT: 156 LBS | DIASTOLIC BLOOD PRESSURE: 80 MMHG | TEMPERATURE: 97.3 F | BODY MASS INDEX: 25.07 KG/M2 | HEART RATE: 93 BPM | RESPIRATION RATE: 12 BRPM | OXYGEN SATURATION: 99 % | SYSTOLIC BLOOD PRESSURE: 110 MMHG

## 2017-06-21 DIAGNOSIS — M25.531 RIGHT WRIST PAIN: ICD-10-CM

## 2017-06-21 DIAGNOSIS — G56.01 CARPAL TUNNEL SYNDROME OF RIGHT WRIST: ICD-10-CM

## 2017-06-21 LAB
BREATH ALCOHOL COMMENT: NORMAL
POC BREATHALIZER: 0 PERCENT (ref 0–0.01)

## 2017-06-21 PROCEDURE — 82075 ASSAY OF BREATH ETHANOL: CPT | Mod: 29 | Performed by: NURSE PRACTITIONER

## 2017-06-21 PROCEDURE — 80305 DRUG TEST PRSMV DIR OPT OBS: CPT | Mod: 29 | Performed by: NURSE PRACTITIONER

## 2017-06-21 PROCEDURE — 99213 OFFICE O/P EST LOW 20 MIN: CPT | Performed by: NURSE PRACTITIONER

## 2017-06-21 ASSESSMENT — ENCOUNTER SYMPTOMS
FEVER: 0
CHILLS: 0
SENSORY CHANGE: 1
WEAKNESS: 0
TINGLING: 1
MYALGIAS: 1
BRUISES/BLEEDS EASILY: 0

## 2017-06-21 NOTE — Clinical Note
"   [facility] [place]   [address] - [place] [address]  Phone: [phone] - Fax: [phone]        [facility] Progress Report and Disability Certification  Date of Service: 6/21/2017   No Show:  No  Date / Time of Next Visit: 6/27/2017   Claim Information   Patient Name: Mirlande Collins  Claim Number:     Employer: [employer]  Date of Injury: 6/20/2017     Insurer / TPA: Workers [employer]  ID / SSN:     Occupation:   Diagnosis: Diagnoses of Right wrist pain and Carpal tunnel syndrome of right wrist were pertinent to this visit.    Medical Information   Related to Industrial Injury? No  Comments:no particular injury known to cause pain    Subjective Complaints:  DOI 6/20/17. Patient states does repetitive motion at work doing the same motion x 1 year. States the last 6 months the pain in right wrist hand has been worse. States right wrist felt \"tight and painful\" with twisting motion and pain radiating up forearm, complains of swelling in right hand. No previous injury to right hand. Right handed. Wears a 'soft wrist wrap' to left wrist due to discomfort as well. Used Ibuprofen 800 mg last night and ice application as well. None today. Hurts to bend at wrist and has to 'shake wrist out' to alleviate pain.   Objective Findings: A/O x 4. No redness, injury, trauma, deformity of right wrist. Right hand appears slightly more swollen than left hand. Decreased ROM with flexion/extension of right wrist. No TTP. Able to make fist without difficulty but has discomfort. Skin p/w/d. Skin sensation intact. Applied right wrist velcro splint. Phalen, Tinel and Carpal compression test invokes pain.   Pre-Existing Condition(s):     Assessment:   Initial Visit    Status: Additional Care Required  Permanent Disability:No    Plan:   Comments:Ibuporfen as needed for pain    Diagnostics:      Comments:       Disability Information   Status: Released " to Restricted Duty    From:  2017  Through: 2017 Restrictions are: Temporary   Physical Restrictions   Sitting:    Standing:    Stooping:    Bending:      Squatting:    Walking:    Climbing:    Pushin hrs/day   Pullin hrs/day Other:    Reaching Above Shoulder (L):   Reaching Above Shoulder (R):       Reaching Below Shoulder (L):    Reaching Below Shoulder (R):      Not to exceed Weight Limits   Carrying(hrs):   Weight Limit(lb): < or = to 10 pounds Lifting(hrs):   Weight  Limit(lb): < or = to 10 pounds   Comments: May use Ibuprofen up to 600 mg every 4-6 hours as needed for pain, May apply ice application to right wrist as needed for swelling and pain, May use topical analgesic as needed for pain, Use wrist splint at work, Perform small stretches for wrist/forearm and range of motion exercises as tolerated daily. Recheck on 17.    Repetitive Actions   Hands: i.e. Fine Manipulations from Grasping: < or = to 2 hrs/day   Feet: i.e. Operating Foot Controls:     Driving / Operate Machinery:     Physician Name: PERLA Quinonez Physician Signature:   e-Signature: Dr. Tello Conway, Medical Director   Clinic Name / Location: [address]  [place] [address] Clinic Phone Number: Dept: [phone]   Appointment Time: 11:00 Am Visit Start Time: 11:15 AM   Check-In Time:  11:06 Am Visit Discharge Time:  12:07   Original-Treating Physician or Chiropractor    Page 2-Insurer/TPA    Page 3-Employer    Page 4-Employee

## 2017-06-21 NOTE — MR AVS SNAPSHOT
"Shawneeemil MCDERMOTT Guero Collins   2017 11:00 AM   Occupational Medicine   MRN: [id number]    Department:  [facility]   Dept Phone:  [phone]    Description:  Female : 1985   Provider:  PERLA Quinonez           Reason for Visit     Wrist Injury W/C Right Wrist DOI 17      Allergies as of 2017     No Known Allergies      You were diagnosed with     Right wrist pain   [798997]       Carpal tunnel syndrome of right wrist   [270696]         Vital Signs     Blood Pressure Pulse Temperature Respirations Height Weight    110/80 mmHg 93 36.3 °C (97.3 °F) 12 1.676 m (5' 5.98\") 70.761 kg (156 lb)    Body Mass Index Oxygen Saturation Smoking Status             25.19 kg/m2 99% Former Smoker         Basic Information     Date Of Birth Sex Race Ethnicity Preferred Language    1985 Female  or   Origin (Turkish,Gambian,Cypriot,Hieu, etc) English      Your appointments     2017  4:00 PM   Workers Compensation with [facility] URGENT [facility]ARE   [facility] ([facility])    [address]  [place] [id number] [phone]            2017  1:20 PM   Established Patient Pul with A Rotation   [facility] (--)    [address]  [address]  [place] [id number] [phone]              Problem List              ICD-10-CM Priority Class Noted - Resolved    Cough R05   2017 - Present    Wheezing R06.2   2017 - Present    Dyspnea R06.00   2017 - Present      Health Maintenance        Date Due Completion Dates    IMM DTaP/Tdap/Td Vaccine (1 - Tdap) 2006 ---    PAP SMEAR 2008 ---            Current Immunizations     No immunizations on file.      Below and/or attached are the medications your provider expects you to take. Review all of your home medications and newly ordered medications with your provider and/or pharmacist. Follow medication instructions as directed by your " provider and/or pharmacist. Please keep your medication list with you and share with your provider. Update the information when medications are discontinued, doses are changed, or new medications (including over-the-counter products) are added; and carry medication information at all times in the event of emergency situations     Allergies:  No Known Allergies          Medications  Valid as of: June 21, 2017 - 12:15 PM    Generic Name Brand Name Tablet Size Instructions for use    Albuterol Sulfate (Aero Soln) albuterol 108 (90 BASE) MCG/ACT Inhale 2 Puffs by mouth every 6 hours as needed for Shortness of Breath.        Amoxicillin (Cap) AMOXIL 500 MG Take 1 Cap by mouth 3 times a day.        Azithromycin (Tab) ZITHROMAX 250 MG Use as directed on ASHLY        Azithromycin (Tab) ZITHROMAX 250 MG 2 tabs day number one then one tab daily for remaining 4 days        Benzonatate (Cap) TESSALON 100 MG Take 1 Cap by mouth 3 times a day as needed for Cough.        DiphenhydrAMINE HCl (Tab) BENADRYL 25 MG Take 25 mg by mouth every 6 hours as needed for Sleep.        Fluticasone Furoate-Vilanterol (AEROSOL POWDER, BREATH ACTIVATED) Fluticasone Furoate-Vilanterol 200-25 MCG/INH Take 1 Inhalation by mouth every day. Rinse mouth after use.        Hydrocod Polst-Chlorphen Polst (Liquid CR) TUSSIONEX 10-8 MG/5ML Take 5 mL by mouth every 12 hours.        HydrOXYzine HCl (Tab) ATARAX 25 MG Take 1 Tab by mouth 3 times a day as needed for Itching.        HydrOXYzine HCl (Tab) ATARAX 25 MG Take 1 Tab by mouth 3 times a day as needed for Itching.        MethylPREDNISolone (Tab) MEDROL DOSPACK 4 MG Take as directed.        MethylPREDNISolone (Tablet Therapy Pack) MEDROL DOSEPAK 4 MG Take as directed with food        TiZANidine HCl (Tab) ZANAFLEX 4 MG Take 1 Tab by mouth 2 times a day. Causes drowsiness, do not drive or work while using        .                 Medicines prescribed today were sent to:     [facility] #[id number] - [place]  [address]    [address] [address]    Phone: [phone] Fax: [phone]    Open 24 Hours?: No      Medication refill instructions:       If your prescription bottle indicates you have medication refills left, it is not necessary to call your provider’s office. Please contact your pharmacy and they will refill your medication.    If your prescription bottle indicates you do not have any refills left, you may request refills at any time through one of the following ways: The online Synacor system (except Urgent Care), by calling your provider’s office, or by asking your pharmacy to contact your provider’s office with a refill request. Medication refills are processed only during regular business hours and may not be available until the next business day. Your provider may request additional information or to have a follow-up visit with you prior to refilling your medication.   *Please Note: Medication refills are assigned a new Rx number when refilled electronically. Your pharmacy may indicate that no refills were authorized even though a new prescription for the same medication is available at the pharmacy. Please request the medicine by name with the pharmacy before contacting your provider for a refill.           Syn[employer]or Access Code: [email or web address]  Expires: 7/12/2017  3:09 PM    Synacor  A secure, online tool to manage your health information     [employer]’s Synacor® is a secure, online tool that connects you to your personalized health information from the privacy of your home -- day or night - making it very easy for you to manage your healthcare. Once the activation process is completed, you can even access your medical information using the Synacor javier, which is available for free in the Apple Javier store or Google Play store.     Synacor provides the following levels of access (as shown below):   My Chart Features   [facility] Primary Care Doctor [facility]  Specialists  [facility]  Urgent  Care [facility]  Primary Care  Doctor   Email your healthcare team securely and privately 24/7 X X X    Manage appointments: schedule your next appointment; view details of past/upcoming appointments X      Request prescription refills. X      View recent personal medical records, including lab and immunizations X X X X   View health record, including health history, allergies, medications X X X X   Read reports about your outpatient visits, procedures, consult and ER notes X X X X   See your discharge summary, which is a recap of your hospital and/or ER visit that includes your diagnosis, lab results, and care plan. X X       How to register for [employer]:  1. Go to  [email or web address].  2. Click on the Sign Up Now box, which takes you to the New Member Sign Up page. You will need to provide the following information:  a. Enter your [employer] Access Code exactly as it appears at the top of this page. (You will not need to use this code after you’ve completed the sign-up process. If you do not sign up before the expiration date, you must request a new code.)   b. Enter your date of birth.   c. Enter your home email address.   d. Click Submit, and follow the next screen’s instructions.  3. Create a [employer] ID. This will be your [employer] login ID and cannot be changed, so think of one that is secure and easy to remember.  4. Create a [employer] password. You can change your password at any time.  5. Enter your Password Reset Question and Answer. This can be used at a later time if you forget your password.   6. Enter your e-mail address. This allows you to receive e-mail notifications when new information is available in [employer].  7. Click Sign Up. You can now view your health information.    For assistance activating your [employer] account, call [phone]

## 2017-06-21 NOTE — PROGRESS NOTES
"Subjective:      Mirlande Collins is a 31 y.o. female who presents with Wrist Injury      DOI 6/20/17. Patient states does repetitive motion at work doing the same motion x 1 year. States the last 6 months the pain in right wrist hand has been worse. States right wrist felt \"tight and painful\" with twisting motion and pain radiating up forearm, complains of swelling in right hand. No previous injury to right hand. Right handed. Wears a 'soft wrist wrap' to left wrist due to discomfort as well. Used Ibuprofen 800 mg last night and ice application as well. None today. Hurts to bend at wrist and has to 'shake wrist out' to alleviate pain.     Wrist Injury   Associated symptoms include tingling.   see above\  PMH:  has a past medical history of Migraine (2011).  MEDS:   Current outpatient prescriptions:   •  diphenhydrAMINE (BENADRYL) 25 MG Tab, Take 25 mg by mouth every 6 hours as needed for Sleep., Disp: , Rfl:   •  tizanidine (ZANAFLEX) 4 MG Tab, Take 1 Tab by mouth 2 times a day. Causes drowsiness, do not drive or work while using, Disp: 10 Tab, Rfl: 0  •  albuterol (VENTOLIN HFA) 108 (90 BASE) MCG/ACT Aero Soln inhalation aerosol, Inhale 2 Puffs by mouth every 6 hours as needed for Shortness of Breath., Disp: , Rfl:   •  Fluticasone Furoate-Vilanterol (BREO ELLIPTA) 200-25 MCG/INH AEROSOL POWDER, BREATH ACTIVATED, Take 1 Inhalation by mouth every day. Rinse mouth after use., Disp: 1 Each, Rfl: 3  •  amoxicillin (AMOXIL) 500 MG Cap, Take 1 Cap by mouth 3 times a day., Disp: 30 Cap, Rfl: 0  •  azithromycin (ZITHROMAX) 250 MG Tab, 2 tabs day number one then one tab daily for remaining 4 days, Disp: 6 Tab, Rfl: 0  •  MethylPREDNISolone (MEDROL DOSEPAK) 4 MG Tablet Therapy Pack, Take as directed with food, Disp: 21 Tab, Rfl: 0  •  hydrOXYzine (ATARAX) 25 MG Tab, Take 1 Tab by mouth 3 times a day as needed for Itching., Disp: 30 Tab, Rfl: 0  •  hydrOXYzine (ATARAX) 25 MG Tab, Take 1 Tab by mouth 3 times a day as " "needed for Itching., Disp: 30 Tab, Rfl: 1  •  methylPREDNISolone (MEDROL DOSPACK) 4 MG Tab, Take as directed., Disp: 21 Tab, Rfl: 1  •  benzonatate (TESSALON) 100 MG CAPS, Take 1 Cap by mouth 3 times a day as needed for Cough., Disp: 30 Cap, Rfl: 0  •  hydrocod polst-chlorphen polst (TUSSIONEX) 10-8 MG/5ML LQCR, Take 5 mL by mouth every 12 hours., Disp: 100 mL, Rfl: 0  •  azithromycin (ZITHROMAX Z-ASHLY) 250 MG TABS, Use as directed on ASHLY, Disp: 6 Tab, Rfl: 0  ALLERGIES: No Known Allergies  SURGHX: History reviewed. No pertinent past surgical history.  SOCHX:  reports that she quit smoking about 7 years ago. She has never used smokeless tobacco. She reports that she drinks alcohol. She reports that she does not use illicit drugs.  FH: Family history was reviewed, no pertinent findings to report      Review of Systems   Constitutional: Negative for fever, chills and malaise/fatigue.   Musculoskeletal: Positive for myalgias and joint pain.   Skin: Negative for itching and rash.   Neurological: Positive for tingling and sensory change. Negative for weakness.   Endo/Heme/Allergies: Does not bruise/bleed easily.   All other systems reviewed and are negative.         Objective:     /80 mmHg  Pulse 93  Temp(Src) 36.3 °C (97.3 °F)  Resp 12  Ht 1.676 m (5' 5.98\")  Wt 70.761 kg (156 lb)  BMI 25.19 kg/m2  SpO2 99%     Physical Exam   Constitutional: She is oriented to person, place, and time. She appears well-developed and well-nourished. No distress.   HENT:   Head: Normocephalic.   Eyes: Conjunctivae and EOM are normal. Pupils are equal, round, and reactive to light.   Neck: Normal range of motion. Neck supple.   Cardiovascular: Normal rate.    Pulmonary/Chest: Effort normal.   Musculoskeletal:        Right wrist: She exhibits decreased range of motion, tenderness and swelling. She exhibits no bony tenderness, no effusion, no crepitus, no deformity and no laceration.        Arms:  Neurological: She is alert and " oriented to person, place, and time.   Skin: Skin is warm and dry. She is not diaphoretic. No erythema.   Vitals reviewed.      A/O x 4. No redness, injury, trauma, deformity of right wrist. Right hand appears slightly more swollen than left hand. Decreased ROM with flexion/extension of right wrist. No TTP. Able to make fist without difficulty but has discomfort. Skin p/w/d. Skin sensation intact. Applied right wrist velcro splint. Phalen, Tinel and Carpal compression test invokes pain.       Assessment/Plan:     1. Right wrist pain    2. Carpal tunnel syndrome of right wrist    May use Ibuprofen prn for pain  May use cool compresses for swelling prn  May utilize RICE method prn  May apply topical analgesics prn  Perform proper body mechanics with lifting, twisting, bending and reaching. Ask for assistance with heay objects > 10#  Monitor for deformity, numbness/tingling in fingers, decreased ROM- need re-evaluation  Recheck on 6/27/17

## 2017-06-21 NOTE — Clinical Note
"EMPLOYEE’S CLAIM FOR COMPENSATION/ REPORT OF INITIAL TREATMENT  FORM C-4    EMPLOYEE’S CLAIM - PROVIDE ALL INFORMATION REQUESTED   First Name  Mirlande Last Name  Guero Collins Birthdate                    1985                Sex  female Claim Number   Home Address  [address]  Age  31 y.o. Height  1.676 m (5' 5.98\") Weight  70.761 kg (156 lb) [facility]     [facility] Zip  [address] Telephone  [phone] (home)    Mailing Address  [address]  [facility] Zip  [address] Primary Language Spoken  English    Insurer   Third Party   Workers Choice   Employee's Occupation (Job Title) When Injury or Occupational Disease Occurred      Employer's Name  [employer] Telephone  [phone]    Employer Address  [facility]  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 (Be specific an answer in detail. Use additional sheet if necessary)  doing the same motion over and over for long periods of time   If you believe that you have an occupational disease, when did you first have knowledge of the disability and it relationship to your employment?  n/a Witnesses to the Accident  n/a      Nature of Injury or Occupational Disease  Workers' Compensation  Part(s) of Body Injured or Affected  Wrist (R) and Hand (R), ,     I certify that the above is true and correct to the best of my knowledge and that I have provided this information in order to obtain the benefits of [place]s [place] Insurance and " [place]  I hereby authorize any physician, chiropractor, surgeon, practitioner, or other person, any hospital, including [facility] or [facility], any medical service organization, any insurance company, or other institution or organization to release to each other, any medical or other information, including benefits paid or payable, pertinent to this injury or disease, except information relative to diagnosis, treatment and/or counseling for AIDS, psychological conditions, alcohol or controlled substances, for which I must give specific authorization.  A Photostat of this authorization shall be as valid as the original.     Date   Place   Employee’s Signature   THIS REPORT MUST BE COMPLETED AND MAILED WITHIN 3 WORKING DAYS OF TREATMENT   Place  [facility]  Name of Facility  [facility]   Date  6/21/2017 Diagnosis  (M25.531) Right wrist pain  (G56.01) Carpal tunnel syndrome of right wrist Is there evidence the injured employee was under the influence of alcohol and/or another controlled substance at the time of accident?   Hour  11:15 AM Description of Injury or Disease  Diagnoses of Right wrist pain and Carpal tunnel syndrome of right wrist were pertinent to this visit. No   Treatment  May use Ibuprofen up to 600 mg every 4-6 hours as needed for pain, May apply ice application to right wrist as needed for swelling and pain, May use topical analgesic as needed for pain, Use wrist splint at work, Perform small stretches for wrist/forearm and range of motion exercises as tolerated daily.  Have you advised the patient to remain off work five days or more? No   X-Ray Findings      If Yes   From Date  To Date      From information given by the employee, together with medical evidence, can you directly connect this injury or occupational disease as job incurred?  Yes If No Full Duty  No Modified Duty  Yes   Is  "additional medical care by a physician indicated?  Yes If Modified Duty, Specify any Limitations / Restrictions  No push/pull motion, fine hand manipulations < 2hr/day, no lifting objects > 10#   Do you know of any previous injury or disease contributing to this condition or occupational disease?                            No   Date  6/21/2017 Print Doctor’s Name PERLA Quinonez certify the employer’s copy of  this form was mailed on:   Address  [address] Insurer’s Use Only     [facility] Zip  [address]    Provider’s Tax ID Number  [id number]  Telephone  Dept: [phone]            e-Signature: Dr. Tello Conway, Medical Director Degree  APRN        ORIGINAL-TREATING PHYSICIAN OR CHIROPRACTOR    PAGE 2-INSURER/TPA    PAGE 3-EMPLOYER    PAGE 4-EMPLOYEE             Form C-4 (rev10/07)              BRIEF DESCRIPTION OF RIGHTS AND BENEFITS  (Pursuant to [place]RS 616C.050)    Notice of Injury or Occupational Disease (Incident Report Form C-1): If an injury or occupational disease (OD) arises out of and in the  course of employment, you must provide written notice to your employer as soon as practicable, but no later than 7 days after the accident or  OD. Your employer shall maintain a sufficient supply of the required forms.    Claim for Compensation (Form C-4): If medical treatment is sought, the form C-4 is available at the place of initial treatment. A completed  \"Claim for Compensation\" (Form C-4) must be filed within 90 days after an accident or OD. The treating physician or chiropractor must,  within 3 working days after treatment, complete and mail to the employer, the employer's insurer and third-party , the Claim for  Compensation.    Medical Treatment: If you require medical treatment for your on-the-job injury or OD, you may be required to select a physician or  chiropractor from a list provided by your workers’ compensation insurer, if it has " contracted with an Organization for Managed Care (MCO) or  Preferred Provider Organization (PPO) or providers of health care. If your employer has not entered into a contract with an MCO or PPO, you  may select a physician or chiropractor from the Panel of Physicians and Chiropractors. Any medical costs related to your industrial injury or  OD will be paid by your insurer.    Temporary Total Disability (TTD): If your doctor has certified that you are unable to work for a period of at least 5 consecutive days, or 5  cumulative days in a 20-day period, or places restrictions on you that your employer does not accommodate, you may be entitled to TTD  compensation.    Temporary Partial Disability (TPD): If the wage you receive upon reemployment is less than the compensation for TTD to which you are  entitled, the insurer may be required to pay you TPD compensation to make up the difference. TPD can only be paid for a maximum of 24  months.    Permanent Partial Disability (PPD): When your medical condition is stable and there is an indication of a PPD as a result of your injury or  OD, within 30 days, your insurer must arrange for an evaluation by a rating physician or chiropractor to determine the degree of your PPD. The  amount of your PPD award depends on the date of injury, the results of the PPD evaluation and your age and wage.    Permanent Total Disability (PTD): If you are medically certified by a treating physician or chiropractor as permanently and totally disabled  and have been granted a PTD status by your insurer, you are entitled to receive monthly benefits not to exceed 66 2/3% of your average  monthly wage. The amount of your PTD payments is subject to reduction if you previously received a PPD award.    Vocational Rehabilitation Services: You may be eligible for vocational rehabilitation services if you are unable to return to the job due to a  permanent physical impairment or permanent restrictions as a  result of your injury or occupational disease.    Transportation and Per Latoya Reimbursement: You may be eligible for travel expenses and per latoya associated with medical treatment.    Reopening: You may be able to reopen your claim if your condition worsens after claim closure.    Appeal Process: If you disagree with a written determination issued by the insurer or the insurer does not respond to your request, you may  appeal to the Department of Administration, , by following the instructions contained in your determination letter. You must  appeal the determination within 70 days from the date of the determination letter at [address], [address], [place], [place]  [address], or [address], [address], [place], [place] [address]. If you disagree with the  decision, you may appeal to the  Department of Administration, . You must file your appeal within 30 days from the date of the  decision  letter at [address], [address], [place], [place] [address], or [address], [address], [place], [place] [address]. If you  disagree with a decision of an , you may file a petition for judicial review with the District Court. You must do so within 30  days of the Appeal Officer’s decision. You may be represented by an  at your own expense or you may contact the [facility] for possible  representation.    [place]  for Injured Workers (NAIW): If you disagree with a  decision, you may request that NAIW represent you  without charge at an  Hearing. For information regarding denial of benefits, you may contact the [facility] at: [address], [address], [place] [address], [phone], or [address], [address], [place] [address], [phone]    To File a Complaint with the Division: If you wish to file a complaint with the  of the Division of  Industrial Relations (DIR),  please contact the Workers’ Compensation Section, [address], [address], [place], [place] [address], telephone [phone], or  [address], [address], [place], [place] [address], telephone [phone].    For assistance with Workers’ Compensation Issues: you may contact the Office of the [facility] Consumer Health Assistance, [address] [place] [address], [address], [place], [place] [address], Toll Free 1-260.909.2265, Web site: [email or web address]., E-mail  [email or web address].                                                                                                                                                                                                                                   __________________________________________________________________                                                                   _________________                Employee Name / Signature                                                                                                                                                       Date                                                                                                                                                                                                     D-2 (rev. 10/07)

## 2017-06-29 ENCOUNTER — OCCUPATIONAL MEDICINE (OUTPATIENT)
Dept: URGENT CARE | Facility: CLINIC | Age: 32
End: 2017-06-29
Payer: COMMERCIAL

## 2017-06-29 VITALS
SYSTOLIC BLOOD PRESSURE: 120 MMHG | TEMPERATURE: 97.9 F | DIASTOLIC BLOOD PRESSURE: 80 MMHG | BODY MASS INDEX: 25.68 KG/M2 | OXYGEN SATURATION: 99 % | WEIGHT: 159 LBS | RESPIRATION RATE: 20 BRPM | HEART RATE: 74 BPM

## 2017-06-29 DIAGNOSIS — S66.911D STRAIN OF RIGHT WRIST, SUBSEQUENT ENCOUNTER: ICD-10-CM

## 2017-06-29 DIAGNOSIS — G56.01 CARPAL TUNNEL SYNDROME OF RIGHT WRIST: ICD-10-CM

## 2017-06-29 DIAGNOSIS — M65.30 TRIGGER FINGER OF RIGHT HAND, UNSPECIFIED FINGER: ICD-10-CM

## 2017-06-29 PROCEDURE — 99213 OFFICE O/P EST LOW 20 MIN: CPT | Performed by: NURSE PRACTITIONER

## 2017-06-29 ASSESSMENT — ENCOUNTER SYMPTOMS
CHILLS: 0
FEVER: 0

## 2017-06-29 NOTE — PROGRESS NOTES
"Subjective:      Mirlande Collins is a 31 y.o. female who presents with Follow-Up      DOI 6/20/17. Patient states does repetitive motion at work doing the same motion x 1 year. States the last 6 months the pain in right wrist hand has been worse. States right wrist felt \"tight and painful\" with twisting motion and pain radiating up forearm, complains of swelling in right hand. No previous injury to right hand. Right handed. Wears a 'soft wrist wrap' to left wrist due to discomfort as well. Used Ibuprofen 800 mg last night and ice application as well. Has been wearing wrist splints at night.  States symptoms have improved today and she is having less pain. Patient reports that she had an episode 2 days ago where the right 4th finger became stuck in a flexed position and she had to straighten it. This has not happened again.      Other  This is a chronic problem. The current episode started more than 1 year ago. The problem occurs intermittently. The problem has been waxing and waning. Pertinent negatives include no chills or fever. Associated symptoms comments: Wrist pain, right . Nothing aggravates the symptoms. She has tried ice, NSAIDs and rest for the symptoms. The treatment provided moderate relief.       Review of Systems   Constitutional: Negative for fever and chills.   Musculoskeletal:        Pain and numbness in the right wrist and paresthesia in the right hand with repetitive motion.          Objective:     /80 mmHg  Pulse 74  Temp(Src) 36.6 °C (97.9 °F)  Resp 20  Wt 72.122 kg (159 lb)  SpO2 99%     Physical Exam   Constitutional: She is oriented to person, place, and time. She appears well-developed and well-nourished. No distress.   Musculoskeletal: Normal range of motion. She exhibits no tenderness.        Right wrist: She exhibits normal range of motion and no tenderness.        Arms:  Neurological: She is alert and oriented to person, place, and time.   Skin: Skin is warm and dry. " She is not diaphoretic.   Psychiatric: She has a normal mood and affect. Her behavior is normal.   Vitals reviewed.      A/O x 4. No redness, injury, trauma, deformity of right wrist. No carpal compression pain at this time. Tinel's and Phalen's negative at this time.  Full ROM in all fingers without contractures.       Assessment/Plan:     1. Strain of right wrist, subsequent encounter    See D39 for restrictions  Continue present treatment  Follow up in 1 week with Nomesia      2. Carpal tunnel syndrome of right wrist  See D39 for restrictions  Continue present treatment  Follow up in 1 week with Nomesia      3. Trigger finger of right hand, unspecified finger    See D39 for restrictions  Continue present treatment  Follow up in 1 week with Nomesia

## 2017-06-29 NOTE — MR AVS SNAPSHOT
Mirlande MCDERMOTT Guero Collins   2017 1:00 PM   Occupational Medicine   MRN: [id number]    Department:  [facility]   Dept Phone:  [phone]    Description:  Female : 1985   Provider:  Cathey J Hamman, A.P.N.           Reason for Visit     Follow-Up Right wrist injury      Allergies as of 2017     No Known Allergies      You were diagnosed with     Strain of right wrist, subsequent encounter   [627054]       Carpal tunnel syndrome of right wrist   [130089]       Trigger finger of right hand, unspecified finger   [2611880]         Vital Signs     Blood Pressure Pulse Temperature Respirations Weight Oxygen Saturation    120/80 mmHg 74 36.6 °C (97.9 °F) 20 72.122 kg (159 lb) 99%    Smoking Status                   Former Smoker           Basic Information     Date Of Birth Sex Race Ethnicity Preferred Language    1985 Female  or   Origin (Emirati,Bahamian,Tunisian,Swazi, etc) English      Your appointments     2017  1:00 PM   Workers Compensation with Cathey J Hamman, A.P.N.   [facility] ([facility])    [address] [place] [address] [address]  [place] [id number] [phone]            Aug 18, 2017 12:40 PM   Established Patient Pul with A Rotation   [facility] (--)    [address]  [address]  [place] [id number] [phone]              Problem List              ICD-10-CM Priority Class Noted - Resolved    Cough R05   2017 - Present    Wheezing R06.2   2017 - Present    Dyspnea R06.00   2017 - Present      Health Maintenance        Date Due Completion Dates    IMM DTaP/Tdap/Td Vaccine (1 - Tdap) 2006 ---    PAP SMEAR 2008 ---            Current Immunizations     No immunizations on file.      Below and/or attached are the medications your provider expects you to take. Review all of your home medications and newly ordered medications with your provider and/or pharmacist. Follow medication  instructions as directed by your provider and/or pharmacist. Please keep your medication list with you and share with your provider. Update the information when medications are discontinued, doses are changed, or new medications (including over-the-counter products) are added; and carry medication information at all times in the event of emergency situations     Allergies:  No Known Allergies          Medications  Valid as of: June 29, 2017 - 12:30 PM    Generic Name Brand Name Tablet Size Instructions for use    Albuterol Sulfate (Aero Soln) albuterol 108 (90 BASE) MCG/ACT Inhale 2 Puffs by mouth every 6 hours as needed for Shortness of Breath.        Amoxicillin (Cap) AMOXIL 500 MG Take 1 Cap by mouth 3 times a day.        Azithromycin (Tab) ZITHROMAX 250 MG Use as directed on ASHLY        Azithromycin (Tab) ZITHROMAX 250 MG 2 tabs day number one then one tab daily for remaining 4 days        Benzonatate (Cap) TESSALON 100 MG Take 1 Cap by mouth 3 times a day as needed for Cough.        DiphenhydrAMINE HCl (Tab) BENADRYL 25 MG Take 25 mg by mouth every 6 hours as needed for Sleep.        Fluticasone Furoate-Vilanterol (AEROSOL POWDER, BREATH ACTIVATED) Fluticasone Furoate-Vilanterol 200-25 MCG/INH Take 1 Inhalation by mouth every day. Rinse mouth after use.        Hydrocod Polst-Chlorphen Polst (Liquid CR) TUSSIONEX 10-8 MG/5ML Take 5 mL by mouth every 12 hours.        HydrOXYzine HCl (Tab) ATARAX 25 MG Take 1 Tab by mouth 3 times a day as needed for Itching.        HydrOXYzine HCl (Tab) ATARAX 25 MG Take 1 Tab by mouth 3 times a day as needed for Itching.        MethylPREDNISolone (Tab) MEDROL DOSPACK 4 MG Take as directed.        MethylPREDNISolone (Tablet Therapy Pack) MEDROL DOSEPAK 4 MG Take as directed with food        TiZANidine HCl (Tab) ZANAFLEX 4 MG Take 1 Tab by mouth 2 times a day. Causes drowsiness, do not drive or work while using        .                 Medicines prescribed today were sent to:      [id number] - [place], [place] - [address]    [address] [place] [address]    Phone: [phone] Fax: [phone]    Open 24 Hours?: No      Medication refill instructions:       If your prescription bottle indicates you have medication refills left, it is not necessary to call your provider’s office. Please contact your pharmacy and they will refill your medication.    If your prescription bottle indicates you do not have any refills left, you may request refills at any time through one of the following ways: The online [employer] system (except Urgent Care), by calling your provider’s office, or by asking your pharmacy to contact your provider’s office with a refill request. Medication refills are processed only during regular business hours and may not be available until the next business day. Your provider may request additional information or to have a follow-up visit with you prior to refilling your medication.   *Please Note: Medication refills are assigned a new Rx number when refilled electronically. Your pharmacy may indicate that no refills were authorized even though a new prescription for the same medication is available at the pharmacy. Please request the medicine by name with the pharmacy before contacting your provider for a refill.           [employer] Access Code: [email or web address]  Expires: 7/12/2017  3:09 PM    [employer]  A secure, online tool to manage your health information     [employer]® is a secure, online tool that connects you to your personalized health information from the privacy of your home -- day or night - making it very easy for you to manage your healthcare. Once the activation process is completed, you can even access your medical information using the [employer] javier, which is available for free in the Apple Javier store or Google Play store.     [employer] provides the following levels of access (as shown below):   My Chart Features   [facility] Primary Care Doctor  [facility]  Urgent  Care Non-[facility]  Primary Care  Doctor   Email your healthcare team securely and privately 24/7 X X X    Manage appointments: schedule your next appointment; view details of past/upcoming appointments X      Request prescription refills. X      View recent personal medical records, including lab and immunizations X X X X   View health record, including health history, allergies, medications X X X X   Read reports about your outpatient visits, procedures, consult and ER notes X X X X   See your discharge summary, which is a recap of your hospital and/or ER visit that includes your diagnosis, lab results, and care plan. X X       How to register for [employer]:  1. Go to  [email or web address].  2. Click on the Sign Up Now box, which takes you to the New Member Sign Up page. You will need to provide the following information:  a. Enter your A[employer]RiS Access Code exactly as it appears at the top of this page. (You will not need to use this code after you’ve completed the sign-up process. If you do not sign up before the expiration date, you must request a new code.)   b. Enter your date of birth.   c. Enter your home email address.   d. Click Submit, and follow the next screen’s instructions.  3. Create a AFFiRiS ID. This will be your AFF[employer]iS login ID and cannot be changed, so think of one that is secure and easy to remember.  4. Create a AFF[employer]RiS password. You can change your password at any time.  5. Enter your Password Reset Question and Answer. This can be used at a later time if you forget your password.   6. Enter your e-mail address. This allows you to receive e-mail notifications when new information is available in AFFiRiS.  7. Click Sign Up. You can now view your health information.    For assistance activating your [employer]iS account, call [phone]

## 2017-06-29 NOTE — Clinical Note
"   [facility]   [address] - [place] [address]  Phone: [phone] - Fax: [phone]        [facility] Progress Report and Disability Certification  Date of Service: 6/29/2017   No Show:  No  Date / Time of Next Visit: 7/6/2017   Claim Information   Patient Name: Mirlande Collins  Claim Number:     Employer: [employer]  Date of Injury: 6/20/2017     Insurer / TPA: Workers Choice  ID / SSN:     Occupation:   Diagnosis: Diagnoses of Strain of right wrist, subsequent encounter, Carpal tunnel syndrome of right wrist, and Trigger finger of right hand, unspecified finger were pertinent to this visit.    Medical Information   Related to Industrial Injury? No    Subjective Complaints:  DOI 6/20/17. Patient states does repetitive motion at work doing the same motion x 1 year. States the last 6 months the pain in right wrist hand has been worse. States right wrist felt \"tight and painful\" with twisting motion and pain radiating up forearm, complains of swelling in right hand. No previous injury to right hand. Right handed. Wears a 'soft wrist wrap' to left wrist due to discomfort as well. Used Ibuprofen 800 mg last night and ice application as well. Has been wearing wrist splints at night.  States symptoms have improved today and she is having less pain. Patient reports that she had an episode 2 days ago where the right 4th finger became stuck in a flexed position and she had to straighten it. This has not happened again.    Objective Findings: A/O x 4. No redness, injury, trauma, deformity of right wrist. No carpal compression pain at this time. Tinel's and Phalen's negative at this time.  Full ROM in all fingers without contractures.   Pre-Existing Condition(s):     Assessment:   Condition Improved    Status: Additional Care Required  Permanent Disability:No    Plan:      Diagnostics:      Comments:       Disability Information "   Status: Released to Restricted Duty    From:  6/29/2017  Through: 7/6/2017 Restrictions are: Temporary   Physical Restrictions   Sitting:    Standing:    Stooping:    Bending:      Squatting:    Walking:    Climbing:    Pushing:  < or = to 2 hrs/day   Pulling:  < or = to 2 hrs/day Other:    Reaching Above Shoulder (L):   Reaching Above Shoulder (R):       Reaching Below Shoulder (L):    Reaching Below Shoulder (R):      Not to exceed Weight Limits   Carrying(hrs):   Weight Limit(lb): < or = to 25 pounds Lifting(hrs):   Weight  Limit(lb): < or = to 25 pounds   Comments: May use Ibuprofen up to 600 mg every 4-6 hours as needed for pain, May apply ice application to right wrist as needed for swelling and pain, May use topical analgesic as needed for pain, Use wrist splint at work, Perform small stretches for wrist/forearm and range of motion exercises as tolerated daily. Recheck on 7/6/17 at [email or web address] at [address] [place] [address]    Repetitive Actions   Hands: i.e. Fine Manipulations from Grasping:     Feet: i.e. Operating Foot Controls:     Driving / Operate Machinery:     Physician Name: Cathey J Hamman, A.P.N. Physician Signature: e-SignHAMMAN, CATHEY J A.P.N. e-Signature: Dr. Tello Conway, Medical Director   Clinic Name / Location: [address] [place] [address] Clinic Phone Number: Dept: [phone]   Appointment Time: 1:00 Pm Visit Start Time: 12:01 PM   Check-In Time:  11:52 Am Visit Discharge Time:  12:29PM   Original-Treating Physician or Chiropractor    Page 2-Insurer/TPA    Page 3-Employer    Page 4-Employee

## 2017-06-29 NOTE — Clinical Note
EMPLOYEE’S CLAIM FOR COMPENSATION/ REPORT OF INITIAL TREATMENT  FORM C-4    EMPLOYEE’S CLAIM - PROVIDE ALL INFORMATION REQUESTED   First Name  Mirlande Last Name  Guero Collins Birthdate                    1985                Sex  female Claim Number   Home Address  [address]  Age  31 y.o. Height  5ft 6in Weight  72.122 kg (159 lb) [facility]     [facility] Zip  [address] Telephone  [phone] (home)    Mailing Address  [address]  [place] Zip  [address] Primary Language Spoken  English    Insurer  Workers [employer] Third Party   Workers Choice   Employee's Occupation (Job Title) When Injury or Occupational Disease Occurred      Employer's Name  [employer]  Telephone  [phone]    Employer Address  [address]  [place]  Zip  [address]    Date of Injury  6/20/2017               Hour of Injury  1:00 PM Date Employer Notified  6/21/2017 Last Day of Work after Injury or Occupational Disease  6/20/2017 Supervisor to Whom Injury Reported  marilia   Address or Location of Accident (if applicable)  [[address]   What were you doing at the time of accident? 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 (Be specific an answer in detail. Use additional sheet if necessary)  doing the same motion over and over for long periods of time   If you believe that you have an occupational disease, when did you first have knowledge of the disability and it relationship to your employment?  n/a Witnesses to the Accident  n/a      Nature of Injury or Occupational Disease  Workers' Compensation  Part(s) of Body Injured or Affected  Wrist (R) and Hand (R), ,     I certify that the above is true and correct to the best of my knowledge and that I have provided this information in order to obtain the benefits of [place]  [place]  I hereby authorize any physician, chiropractor, surgeon, practitioner, or other person, any hospital, including [facility] or [facility], any medical service organization, any insurance company, or other institution or organization to release to each other, any medical or other information, including benefits paid or payable, pertinent to this injury or disease, except information relative to diagnosis, treatment and/or counseling for AIDS, psychological conditions, alcohol or controlled substances, for which I must give specific authorization.  A Photostat of this authorization shall be as valid as the original.     Date   Place   Employee’s Signature   THIS REPORT MUST BE COMPLETED AND MAILED WITHIN 3 WORKING DAYS OF TREATMENT   Place  [facility]  Name of Facility  [facility]   Date  6/29/2017 Diagnosis  (S66.911D) Strain of right wrist, subsequent encounter  (G56.01) Carpal tunnel syndrome of right wrist  (M65.30) Trigger finger of right hand, unspecified finger Is there evidence the injured employee was under the influence of alcohol and/or another controlled substance at the time of accident?   Hour  12:01 PM Description of Injury or Disease  Diagnoses of Strain of right wrist, subsequent encounter, Carpal tunnel syndrome of right wrist, and Trigger finger of right hand, unspecified finger were pertinent to this visit.     Treatment     Have you advised the patient to remain off work five days or more?     X-Ray Findings      If Yes   From Date  To Date      From information given by the employee, together with medical evidence, can you directly connect this injury or occupational disease as job incurred?    If No Full Duty    Modified Duty      Is additional medical care by a physician indicated?    If Modified Duty, Specify any Limitations / Restrictions      Do you know of any previous injury  "or disease contributing to this condition or occupational disease?                                Date  6/29/2017 Print Doctor’s Name Cathey J Hamman, A.P.N. I certify the employer’s copy of  this form was mailed on:   Address  [address] Insurer’s Use Only     [facility] Zip  [address]    Provider’s Tax ID Number  [id number]  Telephone  Dept: [phone]        heide-SignHAMMAN, CATHEY J A.P.N.   e-Signature: Dr. Tello Conway, Medical Director Degree  APN        ORIGINAL-TREATING PHYSICIAN OR CHIROPRACTOR    PAGE 2-INSURER/TPA    PAGE 3-EMPLOYER    PAGE 4-EMPLOYEE             Form C-4 (rev10/07)              BRIEF DESCRIPTION OF RIGHTS AND BENEFITS  (Pursuant to [place]RS 616C.050)    Notice of Injury or Occupational Disease (Incident Report Form C-1): If an injury or occupational disease (OD) arises out of and in the  course of employment, you must provide written notice to your employer as soon as practicable, but no later than 7 days after the accident or  OD. Your employer shall maintain a sufficient supply of the required forms.    Claim for Compensation (Form C-4): If medical treatment is sought, the form C-4 is available at the place of initial treatment. A completed  \"Claim for Compensation\" (Form C-4) must be filed within 90 days after an accident or OD. The treating physician or chiropractor must,  within 3 working days after treatment, complete and mail to the employer, the employer's insurer and third-party , the Claim for  Compensation.    Medical Treatment: If you require medical treatment for your on-the-job injury or OD, you may be required to select a physician or  chiropractor from a list provided by your workers’ compensation insurer, if it has contracted with an Organization for Managed Care (MCO) or  Preferred Provider Organization (PPO) or providers of health care. If your employer has not entered into a contract with an MCO or PPO, you  may select a " physician or chiropractor from the Panel of Physicians and Chiropractors. Any medical costs related to your industrial injury or  OD will be paid by your insurer.    Temporary Total Disability (TTD): If your doctor has certified that you are unable to work for a period of at least 5 consecutive days, or 5  cumulative days in a 20-day period, or places restrictions on you that your employer does not accommodate, you may be entitled to TTD  compensation.    Temporary Partial Disability (TPD): If the wage you receive upon reemployment is less than the compensation for TTD to which you are  entitled, the insurer may be required to pay you TPD compensation to make up the difference. TPD can only be paid for a maximum of 24  months.    Permanent Partial Disability (PPD): When your medical condition is stable and there is an indication of a PPD as a result of your injury or  OD, within 30 days, your insurer must arrange for an evaluation by a rating physician or chiropractor to determine the degree of your PPD. The  amount of your PPD award depends on the date of injury, the results of the PPD evaluation and your age and wage.    Permanent Total Disability (PTD): If you are medically certified by a treating physician or chiropractor as permanently and totally disabled  and have been granted a PTD status by your insurer, you are entitled to receive monthly benefits not to exceed 66 2/3% of your average  monthly wage. The amount of your PTD payments is subject to reduction if you previously received a PPD award.    Vocational Rehabilitation Services: You may be eligible for vocational rehabilitation services if you are unable to return to the job due to a  permanent physical impairment or permanent restrictions as a result of your injury or occupational disease.    Transportation and Per Latoya Reimbursement: You may be eligible for travel expenses and per latoya associated with medical treatment.    Reopening: You may be able  to reopen your claim if your condition worsens after claim closure.    Appeal Process: If you disagree with a written determination issued by the insurer or the insurer does not respond to your request, you may  appeal to the Department of Administration, , by following the instructions contained in your determination letter. You must  appeal the determination within 70 days from the date of the determination letter at [address], [address], [place], [place]  [address], or [address], [address], [place], [place] [address]. If you disagree with the  decision, you may appeal to the  Department of Administration, . You must file your appeal within 30 days from the date of the  decision  letter at [address], [address], [place], [place] [address], or [address], [address], [place], [place] [address]. If you  disagree with a decision of an , you may file a petition for judicial review with the District Court. You must do so within 30  days of the Appeal Officer’s decision. You may be represented by an  at your own expense or you may contact the [facility] for possible  representation.    [place]  for Injured Workers (NAIW): If you disagree with a  decision, you may request that NAIW represent you  without charge at an  Hearing. For information regarding denial of benefits, you may contact the [facility] at: [address], [address], [place] [address], [phone]) [phone], or [address], [address], [place] [address], [phone]    To File a Complaint with the Division: If you wish to file a complaint with the  of the Division of Industrial Relations (DIR),  please contact the Workers’ Compensation Section, [address], [address], [place], [place] [address], telephone ([phone]) [phone], or  [address], [address],  [place], [place] [address], telephone (579) [phone].    For assistance with Workers’ Compensation Issues: you may contact the Office of the Governor Consumer Health Assistance, [address] [place] [address], [address], [place], [place] [address], Toll Free 1-171.751.4609, Web site: http://"[employer]cha.[facility].nv., E-mail  Fabiana@[facility].[email or web address].                                                                                                                                                                                                                                   __________________________________________________________________                                                                   _________________                Employee Name / Signature                                                                                                                                                       Date                                                                                                                                                                                                     D-2 (rev. 10/07)

## 2018-02-20 ENCOUNTER — OFFICE VISIT (OUTPATIENT)
Dept: URGENT CARE | Facility: CLINIC | Age: 33
End: 2018-02-20
Payer: COMMERCIAL

## 2018-02-20 VITALS
OXYGEN SATURATION: 96 % | HEIGHT: 66 IN | DIASTOLIC BLOOD PRESSURE: 70 MMHG | HEART RATE: 88 BPM | SYSTOLIC BLOOD PRESSURE: 110 MMHG | BODY MASS INDEX: 25.39 KG/M2 | RESPIRATION RATE: 20 BRPM | WEIGHT: 158 LBS | TEMPERATURE: 98 F

## 2018-02-20 DIAGNOSIS — S16.1XXA STRAIN OF NECK MUSCLE, INITIAL ENCOUNTER: ICD-10-CM

## 2018-02-20 PROCEDURE — 99214 OFFICE O/P EST MOD 30 MIN: CPT | Performed by: NURSE PRACTITIONER

## 2018-02-20 RX ORDER — PREDNISONE 10 MG/1
TABLET ORAL
Qty: 15 TAB | Refills: 0 | Status: SHIPPED | OUTPATIENT
Start: 2018-02-20 | End: 2018-02-25

## 2018-02-20 RX ORDER — METHYLPREDNISOLONE SODIUM SUCCINATE 125 MG/2ML
125 INJECTION, POWDER, LYOPHILIZED, FOR SOLUTION INTRAMUSCULAR; INTRAVENOUS ONCE
Status: COMPLETED | OUTPATIENT
Start: 2018-02-20 | End: 2018-02-20

## 2018-02-20 RX ORDER — CYCLOBENZAPRINE HCL 10 MG
10 TABLET ORAL 3 TIMES DAILY PRN
Qty: 30 TAB | Refills: 0 | Status: SHIPPED | OUTPATIENT
Start: 2018-02-20 | End: 2018-06-07

## 2018-02-20 RX ORDER — ONDANSETRON 4 MG/1
4 TABLET, ORALLY DISINTEGRATING ORAL EVERY 8 HOURS PRN
Qty: 10 TAB | Refills: 0 | Status: SHIPPED | OUTPATIENT
Start: 2018-02-20 | End: 2018-02-20 | Stop reason: CLARIF

## 2018-02-20 RX ADMIN — METHYLPREDNISOLONE SODIUM SUCCINATE 125 MG: 125 INJECTION, POWDER, LYOPHILIZED, FOR SOLUTION INTRAMUSCULAR; INTRAVENOUS at 10:02

## 2018-02-20 ASSESSMENT — PATIENT HEALTH QUESTIONNAIRE - PHQ9: CLINICAL INTERPRETATION OF PHQ2 SCORE: 0

## 2018-02-20 ASSESSMENT — ENCOUNTER SYMPTOMS
TINGLING: 0
WEAKNESS: 0
NUMBNESS: 0
NECK PAIN: 1

## 2018-02-20 NOTE — LETTER
February 20, 2018         Patient: Mirlande Collins   YOB: 1985   Date of Visit: 2/20/2018           To Whom it May Concern:    Mirlande Collins was seen in my clinic on 2/20/2018. She may return to work om 02/21/18.    If you have any questions or concerns, please don't hesitate to call.        Sincerely,           Cathey J Hamman, A.P.N.  Electronically Signed

## 2018-02-20 NOTE — PROGRESS NOTES
Subjective:      Mirlande Collins is a 32 y.o. female who presents with Neck Pain (Since this morning stiff neck )    Past Medical History:   Diagnosis Date   • Migraine 2011     Social History     Social History   • Marital status:      Spouse name: N/A   • Number of children: N/A   • Years of education: N/A     Occupational History   • Not on file.     Social History Main Topics   • Smoking status: Former Smoker     Packs/day: 0.30     Years: 4.00     Quit date: 1/29/2010   • Smokeless tobacco: Never Used   • Alcohol use 0.0 oz/week      Comment: Socially   • Drug use: No   • Sexual activity: Yes     Partners: Male      Comment: nothing     Other Topics Concern   • Not on file     Social History Narrative   • No narrative on file     Family History   Problem Relation Age of Onset   • Heart Disease Father    • Hypertension Mother      Allergies: Patient has no known allergies.    Patient is a 32-year-old female who presents today with complaint of pain to the left side of her neck with painful and decreased range of motion. She states her neck was slightly stiff yesterday and then this morning when she woke up she had much more stiffness and difficulty moving. Denies any numbness, tingling, or weakness. No known injury.          Neck Pain    This is a new problem. The current episode started in the past 7 days. The problem occurs constantly. The problem has been unchanged. The pain is associated with nothing. The pain is present in the left side. The quality of the pain is described as aching. The symptoms are aggravated by twisting. The pain is same all the time. Stiffness is present all day. Pertinent negatives include no numbness, tingling or weakness. She has tried NSAIDs for the symptoms. The treatment provided no relief.       Review of Systems   Musculoskeletal: Positive for neck pain.   Neurological: Negative for tingling, weakness and numbness.   All other systems reviewed and are  "negative.         Objective:     /70   Pulse 88   Temp 36.7 °C (98 °F)   Resp 20   Ht 1.676 m (5' 6\")   Wt 71.7 kg (158 lb)   SpO2 96%   BMI 25.50 kg/m²      Physical Exam   Constitutional: She is oriented to person, place, and time. She appears well-developed and well-nourished. No distress.   Neck:       Point tenderness over the sternocleidomastoid, and over the posterior left neck and left upper back.  5/5 and equal bilaterally. Pain reproduced with twisting.    Neurological: She is alert and oriented to person, place, and time.   Skin: Skin is warm. Capillary refill takes less than 2 seconds. She is not diaphoretic.   Psychiatric: She has a normal mood and affect. Her behavior is normal. Judgment and thought content normal.   Vitals reviewed.              Assessment/Plan:     1. Neck muscle strain  -flexeril PRN; clearly stated no driving or ETOH with this medication  -solumedrol IM  -prednisone to start on 2/21/18  -clearly advised patient not to take nsaids with present medications  -alternate ice/heat, end with ice  -follow up in 7-10 days for persistent symptoms, or immediately for any numbness or tingling.     "

## 2018-06-07 ENCOUNTER — APPOINTMENT (OUTPATIENT)
Dept: RADIOLOGY | Facility: MEDICAL CENTER | Age: 33
End: 2018-06-07
Attending: EMERGENCY MEDICINE
Payer: COMMERCIAL

## 2018-06-07 ENCOUNTER — HOSPITAL ENCOUNTER (EMERGENCY)
Facility: MEDICAL CENTER | Age: 33
End: 2018-06-07
Attending: EMERGENCY MEDICINE
Payer: COMMERCIAL

## 2018-06-07 VITALS
HEART RATE: 72 BPM | OXYGEN SATURATION: 97 % | RESPIRATION RATE: 14 BRPM | WEIGHT: 158.73 LBS | SYSTOLIC BLOOD PRESSURE: 112 MMHG | HEIGHT: 66 IN | BODY MASS INDEX: 25.51 KG/M2 | DIASTOLIC BLOOD PRESSURE: 54 MMHG | TEMPERATURE: 97.9 F

## 2018-06-07 DIAGNOSIS — O20.9 VAGINAL BLEEDING AFFECTING EARLY PREGNANCY: ICD-10-CM

## 2018-06-07 DIAGNOSIS — O20.0 THREATENED MISCARRIAGE: ICD-10-CM

## 2018-06-07 LAB
APPEARANCE UR: CLEAR
B-HCG SERPL-ACNC: ABNORMAL MIU/ML (ref 0–5)
BACTERIA #/AREA URNS HPF: ABNORMAL /HPF
BASOPHILS # BLD AUTO: 0.3 % (ref 0–1.8)
BASOPHILS # BLD: 0.03 K/UL (ref 0–0.12)
BILIRUB UR QL STRIP.AUTO: NEGATIVE
COLOR UR: YELLOW
EOSINOPHIL # BLD AUTO: 0.12 K/UL (ref 0–0.51)
EOSINOPHIL NFR BLD: 1.3 % (ref 0–6.9)
EPI CELLS #/AREA URNS HPF: ABNORMAL /HPF
ERYTHROCYTE [DISTWIDTH] IN BLOOD BY AUTOMATED COUNT: 42.9 FL (ref 35.9–50)
GLUCOSE UR STRIP.AUTO-MCNC: NEGATIVE MG/DL
HCT VFR BLD AUTO: 40.4 % (ref 37–47)
HGB BLD-MCNC: 14 G/DL (ref 12–16)
HYALINE CASTS #/AREA URNS LPF: ABNORMAL /LPF
IMM GRANULOCYTES # BLD AUTO: 0.04 K/UL (ref 0–0.11)
IMM GRANULOCYTES NFR BLD AUTO: 0.4 % (ref 0–0.9)
KETONES UR STRIP.AUTO-MCNC: NEGATIVE MG/DL
LEUKOCYTE ESTERASE UR QL STRIP.AUTO: ABNORMAL
LYMPHOCYTES # BLD AUTO: 1.97 K/UL (ref 1–4.8)
LYMPHOCYTES NFR BLD: 21 % (ref 22–41)
MCH RBC QN AUTO: 30.4 PG (ref 27–33)
MCHC RBC AUTO-ENTMCNC: 34.7 G/DL (ref 33.6–35)
MCV RBC AUTO: 87.6 FL (ref 81.4–97.8)
MICRO URNS: ABNORMAL
MONOCYTES # BLD AUTO: 0.68 K/UL (ref 0–0.85)
MONOCYTES NFR BLD AUTO: 7.3 % (ref 0–13.4)
NEUTROPHILS # BLD AUTO: 6.53 K/UL (ref 2–7.15)
NEUTROPHILS NFR BLD: 69.7 % (ref 44–72)
NITRITE UR QL STRIP.AUTO: NEGATIVE
NRBC # BLD AUTO: 0 K/UL
NRBC BLD-RTO: 0 /100 WBC
NUMBER OF RH DOSES IND 8505RD: NORMAL
PH UR STRIP.AUTO: 7 [PH]
PLATELET # BLD AUTO: 339 K/UL (ref 164–446)
PMV BLD AUTO: 9.5 FL (ref 9–12.9)
PROT UR QL STRIP: NEGATIVE MG/DL
RBC # BLD AUTO: 4.61 M/UL (ref 4.2–5.4)
RBC # URNS HPF: ABNORMAL /HPF
RBC UR QL AUTO: ABNORMAL
RH BLD: NORMAL
SP GR UR STRIP.AUTO: 1.01
UROBILINOGEN UR STRIP.AUTO-MCNC: 0.2 MG/DL
WBC # BLD AUTO: 9.4 K/UL (ref 4.8–10.8)
WBC #/AREA URNS HPF: ABNORMAL /HPF

## 2018-06-07 PROCEDURE — 36415 COLL VENOUS BLD VENIPUNCTURE: CPT

## 2018-06-07 PROCEDURE — 86901 BLOOD TYPING SEROLOGIC RH(D): CPT

## 2018-06-07 PROCEDURE — 81001 URINALYSIS AUTO W/SCOPE: CPT

## 2018-06-07 PROCEDURE — 76817 TRANSVAGINAL US OBSTETRIC: CPT

## 2018-06-07 PROCEDURE — 84702 CHORIONIC GONADOTROPIN TEST: CPT

## 2018-06-07 PROCEDURE — 85025 COMPLETE CBC W/AUTO DIFF WBC: CPT

## 2018-06-07 PROCEDURE — 99284 EMERGENCY DEPT VISIT MOD MDM: CPT

## 2018-06-07 ASSESSMENT — PAIN SCALES - GENERAL: PAINLEVEL_OUTOF10: 4

## 2018-06-07 NOTE — ED PROVIDER NOTES
" ED Provider Note    Scribed for Yohannes Ross M.D. by Nav Case. 2018  9:41 AM    Primary care provider: None noted  Means of arrival: Walk in  History obtained from: Patient  History limited by: None    CHIEF COMPLAINT  Chief Complaint   Patient presents with   • Vaginal Bleeding     onset 10pm yesterday, spotting this am, described as dark brown   • Pregnancy     LMP 18,    • Abdominal Cramping     low abd       HPI  Mirlande Collins is a 32 y.o. female who presents to the Emergency Department complaining of vaginal bleeding onset last night. Patient is currently pregnant at . Her last menstrual period was some time between May 4 and May 10. Patient states she has been having cramping left lower abdominal pain over the last month with increased severity yesterday. Last night, she started having bright red vaginal bleeding which was less than a normal period. Today, she noticed darker red blood when she wiped. Patient denies any dysuria or fevers.       REVIEW OF SYSTEMS  Pertinent positives include vaginal bleeding, left lower abdominal cramping.   Pertinent negatives include no dysuria, fevers.    All other systems reviewed and negative.    C    PAST MEDICAL HISTORY   has a past medical history of Migraine ().    SURGICAL HISTORY  patient denies any surgical history    SOCIAL HISTORY  Social History   Substance Use Topics   • Smoking status: Former Smoker     Packs/day: 0.30     Years: 4.00     Quit date: 2010   • Smokeless tobacco: Never Used   • Alcohol use 0.0 oz/week      Comment: Socially      History   Drug Use No       FAMILY HISTORY  Family History   Problem Relation Age of Onset   • Heart Disease Father    • Hypertension Mother        CURRENT MEDICATIONS  Reviewed.  See Encounter Summary.     ALLERGIES  No Known Allergies    PHYSICAL EXAM  VITAL SIGNS: /75   Pulse 69   Temp 36.6 °C (97.9 °F)   Resp 17   Ht 1.676 m (5' 6\")   Wt 72 kg (158 lb 11.7 " oz)   SpO2 99%   BMI 25.62 kg/m²   Nursing note and vitals reviewed.  Constitutional: Well-developed and well-nourished. No distress.   HENT: Head is normocephalic and atraumatic. Oropharynx is clear and moist without exudate or erythema.   Eyes: Pupils are equal, round, and reactive to light. Conjunctiva are normal.   Cardiovascular: Normal rate and regular rhythm. No murmur heard. Normal radial pulses.  Pulmonary/Chest: Breath sounds normal. No wheezes or rales.   Abdominal: Soft. Unable to elicit any abdominal tenderness. No distention    Musculoskeletal: Extremities exhibit normal range of motion without edema or tenderness.   Neurological: Awake, alert and oriented to person, place, and time. No focal deficits noted.  Skin: Skin is warm and dry. No rash.   Psychiatric: Normal mood and affect. Appropriate for clinical situation        DIAGNOSTIC STUDIES / PROCEDURES    LABS  Results for orders placed or performed during the hospital encounter of 06/07/18   BETA-HCG QUANTITATIVE SERUM   Result Value Ref Range    Bhcg 67222.1 (H) 0.0 - 5.0 mIU/mL   RH TYPE FOR RHOGAM FROM E.D.   Result Value Ref Range    Number Of Rh Doses Indicated ZERO     Emergency Department Rh Typing POS    CBC WITH DIFFERENTIAL   Result Value Ref Range    WBC 9.4 4.8 - 10.8 K/uL    RBC 4.61 4.20 - 5.40 M/uL    Hemoglobin 14.0 12.0 - 16.0 g/dL    Hematocrit 40.4 37.0 - 47.0 %    MCV 87.6 81.4 - 97.8 fL    MCH 30.4 27.0 - 33.0 pg    MCHC 34.7 33.6 - 35.0 g/dL    RDW 42.9 35.9 - 50.0 fL    Platelet Count 339 164 - 446 K/uL    MPV 9.5 9.0 - 12.9 fL    Neutrophils-Polys 69.70 44.00 - 72.00 %    Lymphocytes 21.00 (L) 22.00 - 41.00 %    Monocytes 7.30 0.00 - 13.40 %    Eosinophils 1.30 0.00 - 6.90 %    Basophils 0.30 0.00 - 1.80 %    Immature Granulocytes 0.40 0.00 - 0.90 %    Nucleated RBC 0.00 /100 WBC    Neutrophils (Absolute) 6.53 2.00 - 7.15 K/uL    Lymphs (Absolute) 1.97 1.00 - 4.80 K/uL    Monos (Absolute) 0.68 0.00 - 0.85 K/uL    Eos  (Absolute) 0.12 0.00 - 0.51 K/uL    Baso (Absolute) 0.03 0.00 - 0.12 K/uL    Immature Granulocytes (abs) 0.04 0.00 - 0.11 K/uL    NRBC (Absolute) 0.00 K/uL   URINALYSIS,CULTURE IF INDICATED   Result Value Ref Range    Color Yellow     Character Clear     Specific Gravity 1.007 <1.035    Ph 7.0 5.0 - 8.0    Glucose Negative Negative mg/dL    Ketones Negative Negative mg/dL    Protein Negative Negative mg/dL    Bilirubin Negative Negative    Urobilinogen, Urine 0.2 Negative    Nitrite Negative Negative    Leukocyte Esterase Trace (A) Negative    Occult Blood Moderate (A) Negative    Micro Urine Req Microscopic    URINE MICROSCOPIC (W/UA)   Result Value Ref Range    WBC 0-2 /hpf    RBC 2-5 (A) /hpf    Bacteria Few (A) None /hpf    Epithelial Cells Few /hpf    Hyaline Cast 0-2 /lpf      All labs reviewed by me.    RADIOLOGY  US-OB PELVIS TRANSVAGINAL   Final Result         1. Single living intrauterine pregnancy at  5 weeks 6 days estimated gestational age.      2. There is a 1.5 cm perigestational hemorrhage. Continued follow-up is recommended.              The radiologist's interpretation of all radiological studies have been reviewed by me.    COURSE & MEDICAL DECISION MAKING  Nursing notes, VS, PMSFHx reviewed in chart.     9:41 AM - Patient seen and examined at bedside. Patient presents with vaginal bleeding, less than a normal period, onset last night. She also has cramping left lower abdominal pain. Discussed plan of care which includes ultrasound evaluation and labs to rule out miscarriage or tubal/ectopic pregnancy. Patient understands and agrees to plan. Ordered US-OB pelvis transvaginal, urinalysis, urine microscopic, beta-HCG quantitative serum, RH tyle for rhogam, CBC to evaluate her symptoms.     The patient presents today with a threatened miscarriage.  Ultrasound reveals a single live intrauterine gestation.  There is a small guerline-gestational hemorrhage.  The patient is Rh+ and therefore does not require  RhoGam.  Urinalysis does not demonstrate any evidence of infection.  The patient will follow up with OB/GYN.    The patient will return for new or worsening symptoms and is stable at the time of discharge.    The patient is referred to a primary physician for blood pressure management, diabetic screening, and for all other preventative health concerns.    DISPOSITION:  Patient will be discharged home in stable condition.    FOLLOW UP:  [facility], Emergency Dept  [address] [id number] [phone]        Debbie Tucker M.D.  [address]  [facility] [place] [address]  [phone]    Schedule an appointment as soon as possible for a visit  call today. OB/GYN      OUTPATIENT MEDICATIONS:  New Prescriptions    No medications on file         FINAL IMPRESSION  1. Threatened miscarriage    2. Vaginal bleeding affecting early pregnancy          Nav NICOLE (Scribe), am scribing for, and in the presence of, Yohannes Ross M.D..    Electronically signed by: Nav Case (Izzy), 6/7/2018    Yohannes NICOLE M.D. personally performed the services described in this documentation, as scribed by Nav Case in my presence, and it is both accurate and complete.    The note accurately reflects work and decisions made by me.  Yohannes Ross  6/7/2018  11:44 AM

## 2018-06-07 NOTE — ED TRIAGE NOTES
Pt amb to triage.  Chief Complaint   Patient presents with   • Vaginal Bleeding     onset 10pm yesterday, spotting this am, described as dark brown   • Pregnancy     LMP 18,    • Abdominal Cramping     low abd

## 2018-06-07 NOTE — DISCHARGE INSTRUCTIONS
Threatened Miscarriage  A threatened miscarriage occurs when you have vaginal bleeding during your first 20 weeks of pregnancy but the pregnancy has not ended. If you have vaginal bleeding during this time, your health care provider will do tests to make sure you are still pregnant. If the tests show you are still pregnant and the developing baby (fetus) inside your womb (uterus) is still growing, your condition is considered a threatened miscarriage.  A threatened miscarriage does not mean your pregnancy will end, but it does increase the risk of losing your pregnancy (complete miscarriage).  What are the causes?  The cause of a threatened miscarriage is usually not known. If you go on to have a complete miscarriage, the most common cause is an abnormal number of chromosomes in the developing baby. Chromosomes are the structures inside cells that hold all your genetic material.  Some causes of vaginal bleeding that do not result in miscarriage include:  · Having sex.  · Having an infection.  · Normal hormone changes of pregnancy.  · Bleeding that occurs when an egg implants in your uterus.  What increases the risk?  Risk factors for bleeding in early pregnancy include:  · Obesity.  · Smoking.  · Drinking excessive amounts of alcohol or caffeine.  · Recreational drug use.  What are the signs or symptoms?  · Light vaginal bleeding.  · Mild abdominal pain or cramps.  How is this diagnosed?  If you have bleeding with or without abdominal pain before 20 weeks of pregnancy, your health care provider will do tests to check whether you are still pregnant. One important test involves using sound waves and a computer (ultrasound) to create images of the inside of your uterus. Other tests include an internal exam of your vagina and uterus (pelvic exam) and measurement of your baby’s heart rate.  You may be diagnosed with a threatened miscarriage if:  · Ultrasound testing shows you are still pregnant.  · Your baby’s heart rate  is strong.  · A pelvic exam shows that the opening between your uterus and your vagina (cervix) is closed.  · Your heart rate and blood pressure are stable.  · Blood tests confirm you are still pregnant.  How is this treated?  No treatments have been shown to prevent a threatened miscarriage from going on to a complete miscarriage. However, the right home care is important.  Follow these instructions at home:  · Make sure you keep all your appointments for prenatal care. This is very important.  · Get plenty of rest.  · Do not have sex or use tampons if you have vaginal bleeding.  · Do not douche.  · Do not smoke or use recreational drugs.  · Do not drink alcohol.  · Avoid caffeine.  Contact a health care provider if:  · You have light vaginal bleeding or spotting while pregnant.  · You have abdominal pain or cramping.  · You have a fever.  Get help right away if:  · You have heavy vaginal bleeding.  · You have blood clots coming from your vagina.  · You have severe low back pain or abdominal cramps.  · You have fever, chills, and severe abdominal pain.  This information is not intended to replace advice given to you by your health care provider. Make sure you discuss any questions you have with your health care provider.  Document Released: 12/18/2006 Document Revised: 05/25/2017 Document Reviewed: 10/14/2014  [employer] Interactive Patient Education © 2017 [employer]

## 2018-07-13 ENCOUNTER — OFFICE VISIT (OUTPATIENT)
Dept: URGENT CARE | Facility: CLINIC | Age: 33
End: 2018-07-13
Payer: COMMERCIAL

## 2018-07-13 VITALS
BODY MASS INDEX: 26.36 KG/M2 | SYSTOLIC BLOOD PRESSURE: 112 MMHG | WEIGHT: 164 LBS | OXYGEN SATURATION: 100 % | DIASTOLIC BLOOD PRESSURE: 80 MMHG | TEMPERATURE: 98.1 F | RESPIRATION RATE: 17 BRPM | HEIGHT: 66 IN | HEART RATE: 94 BPM

## 2018-07-13 DIAGNOSIS — R21 RASH: ICD-10-CM

## 2018-07-13 PROCEDURE — 99214 OFFICE O/P EST MOD 30 MIN: CPT | Performed by: FAMILY MEDICINE

## 2018-07-13 RX ORDER — PRAMOXINE HYDROCHLORIDE 10 MG/ML
LOTION TOPICAL
Qty: 1 BOTTLE | Refills: 0 | Status: SHIPPED | OUTPATIENT
Start: 2018-07-13 | End: 2018-12-17

## 2018-07-13 RX ORDER — TRIAMCINOLONE ACETONIDE 0.25 MG/G
CREAM TOPICAL
Qty: 455 G | Refills: 0 | Status: SHIPPED | OUTPATIENT
Start: 2018-07-13 | End: 2018-12-17

## 2018-07-13 ASSESSMENT — ENCOUNTER SYMPTOMS
SHORTNESS OF BREATH: 0
DIZZINESS: 0
FEVER: 0
FOCAL WEAKNESS: 0
CHILLS: 0

## 2018-07-13 NOTE — PROGRESS NOTES
"Subjective:      Mirlande Collins is a 32 y.o. female who presents with Rash (allergic reaction suspects raisins covered with chololate, (pregnant x3mths), very itchi, using cortizon cream without relief)    Chief Complaint   Patient presents with   • Rash     allergic reaction suspects raisins covered with chololate, (pregnant x3mths), very itchi, using cortizon cream without relief        - This is a very pleasant 32 y.o. female with complaints of dry itchy skin x 1 wk. Nothing new she can think of.           ALLERGIES:  Patient has no known allergies.     PMH:  Past Medical History:   Diagnosis Date   • Migraine 2011        MEDS:    Current Outpatient Prescriptions:   •  Prenatal MV & Min w/FA-DHA (PRENATAL ADULT GUMMY/DHA/FA) 0.4-25 MG Chew Tab, Take  by mouth., Disp: , Rfl:   •  triamcinolone acetonide (KENALOG) 0.025 % Cream, Use BID x 1 wk, Disp: 455 g, Rfl: 0  •  Pramoxine HCl (SARNA SENSITIVE) 1 % Lotion, Use as directed, Disp: 1 Bottle, Rfl: 0    ** I have documented what I find to be significant in regards to past medical, social, family and surgical history  in my HPI or under PMH/PSH/FH review section, otherwise it is contributory **           HPI    Review of Systems   Constitutional: Negative for chills and fever.   Respiratory: Negative for shortness of breath.    Cardiovascular: Negative for chest pain.   Skin: Positive for itching and rash.   Neurological: Negative for dizziness and focal weakness.          Objective:     /80   Pulse 94   Temp 36.7 °C (98.1 °F)   Resp 17   Ht 1.676 m (5' 6\")   Wt 74.4 kg (164 lb)   SpO2 100%   Breastfeeding? No   BMI 26.47 kg/m²      Physical Exam   Constitutional: She appears well-developed. No distress.   HENT:   Head: Normocephalic and atraumatic.   Mouth/Throat: Oropharynx is clear and moist.   Eyes: Conjunctivae are normal.   Neck: Neck supple.   Cardiovascular: Regular rhythm.    No murmur heard.  Pulmonary/Chest: Effort normal. No " respiratory distress.   Neurological: She is alert. She exhibits normal muscle tone.   Skin: Skin is warm and dry.   Psychiatric: She has a normal mood and affect. Judgment normal.   Nursing note and vitals reviewed.  skin on abd breast w/ dry patches             Assessment/Plan:         1. Rash  triamcinolone acetonide (KENALOG) 0.025 % Cream    Pramoxine HCl (SARNA SENSITIVE) 1 % Lotion       - change to cetaphil or dove sensitive skin soap  - luke warm showers  - call/discuss Rx meds w/ her OB before starting       Dx & d/c instructions discussed w/ patient and/or family members. Follow up w/ Prvt Dr or here in 3-4 days if not getting better, sooner if needed,  ER if worse and UC/PCP unavailable.        Possible side effects (i.e. Rash, GI upset/constipation, sedation, elevation of BP or sugars) of any medications given discussed.

## 2018-12-17 ENCOUNTER — OFFICE VISIT (OUTPATIENT)
Dept: INTERNAL MEDICINE | Facility: MEDICAL CENTER | Age: 33
End: 2018-12-17
Payer: COMMERCIAL

## 2018-12-17 VITALS
SYSTOLIC BLOOD PRESSURE: 128 MMHG | WEIGHT: 188 LBS | HEART RATE: 95 BPM | TEMPERATURE: 98.7 F | DIASTOLIC BLOOD PRESSURE: 80 MMHG | BODY MASS INDEX: 30.22 KG/M2 | OXYGEN SATURATION: 96 % | HEIGHT: 66 IN

## 2018-12-17 DIAGNOSIS — Z3A.34 34 WEEKS GESTATION OF PREGNANCY: ICD-10-CM

## 2018-12-17 DIAGNOSIS — Z76.89 ENCOUNTER TO ESTABLISH CARE: ICD-10-CM

## 2018-12-17 PROBLEM — R06.2 WHEEZING: Status: RESOLVED | Noted: 2017-01-05 | Resolved: 2018-12-17

## 2018-12-17 PROBLEM — R06.00 DYSPNEA: Status: RESOLVED | Noted: 2017-01-05 | Resolved: 2018-12-17

## 2018-12-17 PROBLEM — R05.9 COUGH: Status: RESOLVED | Noted: 2017-01-05 | Resolved: 2018-12-17

## 2018-12-17 PROCEDURE — 99203 OFFICE O/P NEW LOW 30 MIN: CPT | Mod: GE | Performed by: INTERNAL MEDICINE

## 2018-12-17 ASSESSMENT — ENCOUNTER SYMPTOMS
NAUSEA: 1
HEADACHES: 0
BLURRED VISION: 0
FOCAL WEAKNESS: 0
ABDOMINAL PAIN: 0
DIZZINESS: 0
FLANK PAIN: 0
SHORTNESS OF BREATH: 0
VOMITING: 0
WHEEZING: 0
DOUBLE VISION: 0
DIARRHEA: 0
CONSTIPATION: 0
COUGH: 0
SENSORY CHANGE: 0
PALPITATIONS: 0

## 2018-12-17 NOTE — PROGRESS NOTES
New Patient to Establish    Reason to establish: New patient to establish    CC:   Establish care  Referral for midwife    HPI: This is a 32-year-old female at 34 weeks gestation, with no significant past medical history who presents to establish care.  She is currently seeing Dr. Alexandria Cha OB/GYN, next appointment is this coming Friday.  She is due 1/29/2019 and has had all pregnancy screening completed, including STI screen and blood typing.  Her Pap smear is up to date.  She has had an uncomplicated pregnancy, besides some vaginal bleeding 6 months ago which has since resolved.  Currently denies vaginal bleeding/spotting or vaginal discharge.  Endorses some mild nausea, denies vomiting, headache, changes in vision, chest pain, palpitations or dyspnea.  Denies gestational diabetes.  She is here today for referral for a midwife.  She wishes to give birth inpatient but would like the midwife to be there for support.  Patient has no acute complaints today.    Patient Active Problem List    Diagnosis Date Noted   • 34 weeks gestation of pregnancy 12/17/2018       Past Medical History:   Diagnosis Date   • Migraine 2011       Current Outpatient Prescriptions   Medication Sig Dispense Refill   • Prenatal MV & Min w/FA-DHA (PRENATAL ADULT GUMMY/DHA/FA) 0.4-25 MG Chew Tab Take  by mouth.       No current facility-administered medications for this visit.        Allergies as of 12/17/2018   • (No Known Allergies)       Social History     Social History   • Marital status:      Spouse name: N/A   • Number of children: N/A   • Years of education: N/A     Occupational History   • Not on file.     Social History Main Topics   • Smoking status: Former Smoker     Packs/day: 0.30     Years: 4.00     Quit date: 1/29/2010   • Smokeless tobacco: Never Used   • Alcohol use No      Comment: stopped drinking during pregnancy   • Drug use: No   • Sexual activity: Yes     Partners: Male      Comment: nothing     Other Topics  "Concern   • Not on file     Social History Narrative   • No narrative on file       Family History   Problem Relation Age of Onset   • Heart Disease Father    • Stroke Father    • Hypertension Mother    • Cancer Mother 31        uterine   • Diabetes Maternal Grandfather        History reviewed. No pertinent surgical history.    ROS: As per HPI. Additional pertinent systems as noted below.    Review of Systems   Eyes: Negative for blurred vision and double vision.   Respiratory: Negative for cough, shortness of breath and wheezing.    Cardiovascular: Negative for chest pain and palpitations.   Gastrointestinal: Positive for nausea. Negative for abdominal pain, constipation, diarrhea and vomiting.   Genitourinary: Negative for dysuria, flank pain, frequency, hematuria and urgency.   Neurological: Negative for dizziness, sensory change, focal weakness and headaches.   All other systems reviewed and are negative.      /80 (BP Location: Right arm, Patient Position: Sitting, BP Cuff Size: Adult)   Pulse 95   Temp 37.1 °C (98.7 °F) (Temporal)   Ht 1.664 m (5' 5.5\")   Wt 85.3 kg (188 lb)   SpO2 96%   BMI 30.81 kg/m²     Physical Exam  Physical Exam   Constitutional: She is oriented to person, place, and time and well-developed, well-nourished, and in no distress.   HENT:   Head: Normocephalic and atraumatic.   Mouth/Throat: Oropharynx is clear and moist.   Eyes: Pupils are equal, round, and reactive to light. Conjunctivae and EOM are normal.   Neck: No tracheal deviation present. No thyromegaly present.   Cardiovascular: Normal rate, regular rhythm and normal heart sounds.  Exam reveals no gallop and no friction rub.    No murmur heard.  Pulmonary/Chest: Effort normal. No respiratory distress. She has no wheezes.   Abdominal: Soft. There is no tenderness.   Gravid uterus   Musculoskeletal: Normal range of motion. She exhibits no edema.   Lymphadenopathy:     She has no cervical adenopathy.   Neurological: She is " oriented to person, place, and time.   Skin: Skin is warm and dry.   Psychiatric: Mood and affect normal.   Vitals reviewed.      Assessment and Plan    1. Encounter to establish care  2. 34 weeks gestation of pregnancy  -Establish care, no significant past medical history, no acute complaints today, physical exam benign  -Follows with Dr. Alexandria Cha OB/GYN, next appointment this Friday.  Full pregnancy screen was done including blood typing and STI screen, as per patient.  -Patient says that she is due January 29, 2019  -Uncomplicated pregnancy, besides vaginal bleeding 6 months ago which has resolved. No evidence of gestational diabetes or eclampsia, blood pressure is well controlled and patient denies headache, dizziness, palpitations or chest pain  -Patient would like referral for midwife, she would like the midwife to be present when she gives birth inpatient.  Referral sent.  -Follow-up postpartum for regular health maintenance      This dictation has been created using a voice recognition software.  The accuracy of the dictation is limited to the abilities of the software.  I expect there may be some errors of grammar and possibly content.  I have made every attempt to manually correct the errors within my dictation.  However errors related to this voice recognition software may still exist and should be interpreted within the appropriate context.      Signed by: Jeff Callahan M.D.

## 2018-12-31 ENCOUNTER — TELEPHONE (OUTPATIENT)
Dept: INTERNAL MEDICINE | Facility: MEDICAL CENTER | Age: 33
End: 2018-12-31

## 2019-01-01 NOTE — TELEPHONE ENCOUNTER
1. Caller Name: Pt                      Call Back Number: [phone] (home)     2. Message: Patient called and left a message stating someone had contacted her about her referral but was unable to reach anyone back so would like a call back.    3. Patient approves office to leave a detailed voicemail/MyChart message: N\A      I called patient back today and notified her that I looked into her chart and seen we had placed a referral for her to receive midwife services but that is not a covered benefit and she would have to look it up her self. She stated that she had spoke to one already named Cordelia Henry and she let her know that it would be through insurance but there would be a gap for the help. I let her know that was good and hopefully it works out. She will keep in touch

## 2019-02-04 ENCOUNTER — HOSPITAL ENCOUNTER (OUTPATIENT)
Facility: MEDICAL CENTER | Age: 34
End: 2019-02-04
Attending: OBSTETRICS & GYNECOLOGY | Admitting: OBSTETRICS & GYNECOLOGY
Payer: COMMERCIAL

## 2019-02-04 VITALS
DIASTOLIC BLOOD PRESSURE: 79 MMHG | HEIGHT: 66 IN | TEMPERATURE: 97.7 F | SYSTOLIC BLOOD PRESSURE: 123 MMHG | WEIGHT: 204 LBS | HEART RATE: 85 BPM | BODY MASS INDEX: 32.78 KG/M2

## 2019-02-04 LAB — CRYSTALS AMN MICRO: NORMAL

## 2019-02-04 PROCEDURE — 59025 FETAL NON-STRESS TEST: CPT

## 2019-02-04 PROCEDURE — 89060 EXAM SYNOVIAL FLUID CRYSTALS: CPT

## 2019-02-04 NOTE — PROGRESS NOTES
Progress note    February 4, 2019    Procedure: Nonstress test, reactive category 1    Indications: Patient is a 33-year-old female para 0 presented at 40-5/7 weeks to rule out rupture of amniotic sac.  There was no pooling ferning was negative.  She was scheduled for induction on the eighth.  Nonstress test was reactive reassuring

## 2019-02-04 NOTE — PROGRESS NOTES
0145 -  here with EDC of 2019 = 40.5 weeks reporting LOF since 2300. Reports positive FM, denies UC's or VB. Taken to [facility] accompanied by FOB, instructed to change and call out when ready.   0206 - POC discussed, questions answered. VS taken, monitors applied x2. Reports she has IOL scheduled at [facility] on Wednesday, cervix was 1cm in the office last week.  0215 - SSE, no pooling, fern collected. SVE 3/70/2.  0239 - report given to Dr. Brian, discharge order received.    - discharge instructions given as follows:  If you think you are in labor, time contractions (laying on your left side) from the beginning of one contraction to the beginning of the next contraction for at least one hour.   Increase fluid intake:10-12 8oz glasses non-caffeinated fluid per day.  Report any pressure or burning on urination to your physician.   Monitor fetal movement: You should be able to count 10 sets of movements in 2 hrs. If you notice an absence or marked decrease in fetal movement, call your physician or go to the hospital.   Report any sudden, sharp abdominal pain.   Report any bleeding, spotting or pinkish discharge is normal after vaginal exam and or sexual intercourse.   Call MD or return to unit if:  You have regular contractions that get progressively closer, longer and stronger.   Your water breaks (remember time and color).   You have bleeding like a period.  Decreased or absent fetal movement.   Questions answered, understanding verbalized.   0251 - discharged home with family in stable walking condition.

## 2019-08-29 ENCOUNTER — OFFICE VISIT (OUTPATIENT)
Dept: URGENT CARE | Facility: CLINIC | Age: 34
End: 2019-08-29
Payer: COMMERCIAL

## 2019-08-29 VITALS
HEIGHT: 66 IN | OXYGEN SATURATION: 94 % | SYSTOLIC BLOOD PRESSURE: 112 MMHG | TEMPERATURE: 99.9 F | BODY MASS INDEX: 27.29 KG/M2 | RESPIRATION RATE: 16 BRPM | HEART RATE: 106 BPM | DIASTOLIC BLOOD PRESSURE: 58 MMHG | WEIGHT: 169.8 LBS

## 2019-08-29 DIAGNOSIS — N61.0 MASTITIS: ICD-10-CM

## 2019-08-29 LAB
FLUAV+FLUBV AG SPEC QL IA: NEGATIVE
INT CON NEG: NEGATIVE
INT CON POS: POSITIVE

## 2019-08-29 PROCEDURE — 87804 INFLUENZA ASSAY W/OPTIC: CPT | Performed by: PHYSICIAN ASSISTANT

## 2019-08-29 PROCEDURE — 99214 OFFICE O/P EST MOD 30 MIN: CPT | Performed by: PHYSICIAN ASSISTANT

## 2019-08-29 ASSESSMENT — ENCOUNTER SYMPTOMS
FEVER: 1
VOMITING: 0
SINUS PAIN: 0
SORE THROAT: 0
SHORTNESS OF BREATH: 0
MYALGIAS: 1
CHILLS: 1
ABDOMINAL PAIN: 0
WHEEZING: 0
NAUSEA: 0
ARTHRALGIAS: 0
FATIGUE: 1
SPUTUM PRODUCTION: 0
PALPITATIONS: 0
HEADACHES: 0
DIARRHEA: 0
BREAST PAIN: 1
COUGH: 0
CHANGE IN BOWEL HABIT: 0

## 2019-08-29 ASSESSMENT — PATIENT HEALTH QUESTIONNAIRE - PHQ9: CLINICAL INTERPRETATION OF PHQ2 SCORE: 0

## 2019-08-29 NOTE — PROGRESS NOTES
Subjective:      Mirlande Collins is a 33 y.o. female who presents with Flu Like Symptoms (chills, had fever, body aches x 1 day) and Breast Pain ( breast feeds, left side, cramping, soreness, dryness x 1 day)            Breast Pain   This is a new problem. The current episode started yesterday. The problem occurs constantly. The problem has been unchanged. Associated symptoms include chills, congestion, fatigue, a fever (subjective ) and myalgias. Pertinent negatives include no abdominal pain, arthralgias, change in bowel habit, chest pain, coughing, headaches, nausea, rash, sore throat, urinary symptoms or vomiting. Associated symptoms comments: Left breast pain and redness. Breast warmth. Patient breastfeeds. Nothing aggravates the symptoms. Treatments tried: pumping breast.     Past Medical History:   Diagnosis Date   • Migraine 2011       No past surgical history on file.    Family History   Problem Relation Age of Onset   • Heart Disease Father    • Stroke Father    • Hypertension Mother    • Cancer Mother 31        uterine   • Diabetes Maternal Grandfather        No Known Allergies    Medications, Allergies, and current problem list reviewed today in Epic    Review of Systems   Constitutional: Positive for chills, fatigue, fever (subjective ) and malaise/fatigue.   HENT: Positive for congestion. Negative for ear discharge, ear pain, sinus pain and sore throat.    Respiratory: Negative for cough, sputum production, shortness of breath and wheezing.    Cardiovascular: Negative for chest pain, palpitations and leg swelling.   Gastrointestinal: Negative for abdominal pain, change in bowel habit, diarrhea, nausea and vomiting.   Genitourinary:        Left breast pain    Musculoskeletal: Positive for myalgias. Negative for arthralgias.   Skin: Negative for rash.   Neurological: Negative for headaches.     All other systems reviewed and are negative.        Objective:     /58 (BP Location: Right  "arm, Patient Position: Sitting, BP Cuff Size: Adult)   Pulse (!) 106   Temp 37.7 °C (99.9 °F) (Temporal)   Resp 16   Ht 1.664 m (5' 5.5\")   Wt 77 kg (169 lb 12.8 oz)   SpO2 94%   Breastfeeding? Yes   BMI 27.83 kg/m²      Physical Exam   Constitutional: She is oriented to person, place, and time. She appears well-developed and well-nourished. No distress.   HENT:   Head: Normocephalic and atraumatic.   Mouth/Throat: Oropharynx is clear and moist.   Eyes: Conjunctivae are normal.   Neck: Neck supple.   Cardiovascular: Normal rate, regular rhythm and normal heart sounds. Exam reveals no gallop and no friction rub.   No murmur heard.  Pulmonary/Chest: Effort normal and breath sounds normal. No respiratory distress. She has no wheezes. She has no rhonchi. She has no rales.       Lymphadenopathy:     She has no cervical adenopathy.   Neurological: She is alert and oriented to person, place, and time. No cranial nerve deficit.   Skin: Skin is warm and dry.   Psychiatric: She has a normal mood and affect. Her behavior is normal. Judgment and thought content normal.               Assessment/Plan:     1. Mastitis    - POCT Influenza A/B- negative  - dicloxacillin (DYNAPEN) 500 MG Cap; Take 1 Cap by mouth 4 times a day for 10 days.  Dispense: 40 Cap; Refill: 0    Warm compresses.     Differential diagnoses, Supportive care, and indications for immediate follow-up discussed with patient.   Instructed to return to clinic or nearest emergency department for any change in condition, further concerns, or worsening of symptoms.    The patient demonstrated a good understanding and agreed with the treatment plan.    aMine Queen P.A.-C.      "

## 2020-04-07 ENCOUNTER — TELEPHONE (OUTPATIENT)
Dept: PULMONOLOGY | Facility: HOSPICE | Age: 35
End: 2020-04-07

## 2020-04-07 NOTE — TELEPHONE ENCOUNTER
VOICEMAIL:  Caller:Mirlande    Phone number:[phone] (home)     Message: Pt called and l/m. Requesting a RF on her inhaler and a note that she has asthma.       Called and spoke with pt. Notified pt we are unable to RF her inhaler because we have not seen her >3yrs. Pt understood.